# Patient Record
Sex: FEMALE | Race: BLACK OR AFRICAN AMERICAN | NOT HISPANIC OR LATINO | Employment: OTHER | URBAN - METROPOLITAN AREA
[De-identification: names, ages, dates, MRNs, and addresses within clinical notes are randomized per-mention and may not be internally consistent; named-entity substitution may affect disease eponyms.]

---

## 2023-03-01 LAB
ALANINE AMINOTRANSFERASE (SGPT) (U/L) IN SER/PLAS: 12 U/L (ref 7–45)
ALBUMIN (G/DL) IN SER/PLAS: 4 G/DL (ref 3.4–5)
ALKALINE PHOSPHATASE (U/L) IN SER/PLAS: 50 U/L (ref 33–136)
ANION GAP IN SER/PLAS: 12 MMOL/L (ref 10–20)
ASPARTATE AMINOTRANSFERASE (SGOT) (U/L) IN SER/PLAS: 16 U/L (ref 9–39)
BASOPHILS (10*3/UL) IN BLOOD BY AUTOMATED COUNT: 0.04 X10E9/L (ref 0–0.1)
BASOPHILS/100 LEUKOCYTES IN BLOOD BY AUTOMATED COUNT: 0.7 % (ref 0–2)
BILIRUBIN TOTAL (MG/DL) IN SER/PLAS: 0.3 MG/DL (ref 0–1.2)
C REACTIVE PROTEIN (MG/L) IN SER/PLAS BY HIGH SENSIT: 33.9 MG/L
CALCIDIOL (25 OH VITAMIN D3) (NG/ML) IN SER/PLAS: 46 NG/ML
CALCIUM (MG/DL) IN SER/PLAS: 9.9 MG/DL (ref 8.6–10.6)
CARBON DIOXIDE, TOTAL (MMOL/L) IN SER/PLAS: 29 MMOL/L (ref 21–32)
CHLORIDE (MMOL/L) IN SER/PLAS: 102 MMOL/L (ref 98–107)
CHOLESTEROL (MG/DL) IN SER/PLAS: 184 MG/DL (ref 0–199)
CHOLESTEROL IN HDL (MG/DL) IN SER/PLAS: 69.2 MG/DL
CHOLESTEROL/HDL RATIO: 2.7
COBALAMIN (VITAMIN B12) (PG/ML) IN SER/PLAS: 469 PG/ML (ref 211–911)
CREATININE (MG/DL) IN SER/PLAS: 0.89 MG/DL (ref 0.5–1.05)
EOSINOPHILS (10*3/UL) IN BLOOD BY AUTOMATED COUNT: 0.37 X10E9/L (ref 0–0.4)
EOSINOPHILS/100 LEUKOCYTES IN BLOOD BY AUTOMATED COUNT: 6 % (ref 0–6)
ERYTHROCYTE DISTRIBUTION WIDTH (RATIO) BY AUTOMATED COUNT: 15.2 % (ref 11.5–14.5)
ERYTHROCYTE MEAN CORPUSCULAR HEMOGLOBIN CONCENTRATION (G/DL) BY AUTOMATED: 30.2 G/DL (ref 32–36)
ERYTHROCYTE MEAN CORPUSCULAR VOLUME (FL) BY AUTOMATED COUNT: 86 FL (ref 80–100)
ERYTHROCYTES (10*6/UL) IN BLOOD BY AUTOMATED COUNT: 4.88 X10E12/L (ref 4–5.2)
GFR FEMALE: 65 ML/MIN/1.73M2
GLUCOSE (MG/DL) IN SER/PLAS: 81 MG/DL (ref 74–99)
HEMATOCRIT (%) IN BLOOD BY AUTOMATED COUNT: 42.1 % (ref 36–46)
HEMOGLOBIN (G/DL) IN BLOOD: 12.7 G/DL (ref 12–16)
IMMATURE GRANULOCYTES/100 LEUKOCYTES IN BLOOD BY AUTOMATED COUNT: 0.5 % (ref 0–0.9)
LDL: 95 MG/DL (ref 0–99)
LEUKOCYTES (10*3/UL) IN BLOOD BY AUTOMATED COUNT: 6.2 X10E9/L (ref 4.4–11.3)
LYMPHOCYTES (10*3/UL) IN BLOOD BY AUTOMATED COUNT: 1.54 X10E9/L (ref 0.8–3)
LYMPHOCYTES/100 LEUKOCYTES IN BLOOD BY AUTOMATED COUNT: 25 % (ref 13–44)
MONOCYTES (10*3/UL) IN BLOOD BY AUTOMATED COUNT: 0.88 X10E9/L (ref 0.05–0.8)
MONOCYTES/100 LEUKOCYTES IN BLOOD BY AUTOMATED COUNT: 14.3 % (ref 2–10)
NEUTROPHILS (10*3/UL) IN BLOOD BY AUTOMATED COUNT: 3.29 X10E9/L (ref 1.6–5.5)
NEUTROPHILS/100 LEUKOCYTES IN BLOOD BY AUTOMATED COUNT: 53.5 % (ref 40–80)
NRBC (PER 100 WBCS) BY AUTOMATED COUNT: 0 /100 WBC (ref 0–0)
PLATELETS (10*3/UL) IN BLOOD AUTOMATED COUNT: 303 X10E9/L (ref 150–450)
POTASSIUM (MMOL/L) IN SER/PLAS: 4.8 MMOL/L (ref 3.5–5.3)
PROTEIN TOTAL: 6.9 G/DL (ref 6.4–8.2)
SODIUM (MMOL/L) IN SER/PLAS: 138 MMOL/L (ref 136–145)
THYROTROPIN (MIU/L) IN SER/PLAS BY DETECTION LIMIT <= 0.05 MIU/L: 1.16 MIU/L (ref 0.44–3.98)
TRIGLYCERIDE (MG/DL) IN SER/PLAS: 97 MG/DL (ref 0–149)
UREA NITROGEN (MG/DL) IN SER/PLAS: 23 MG/DL (ref 6–23)
VLDL: 19 MG/DL (ref 0–40)

## 2023-03-02 LAB — SARS-COV-2 NUCLEOCAPSID IGG AB: NEGATIVE

## 2023-03-31 DIAGNOSIS — K21.9 GASTROESOPHAGEAL REFLUX DISEASE WITHOUT ESOPHAGITIS: ICD-10-CM

## 2023-03-31 DIAGNOSIS — K21.9 GASTROESOPHAGEAL REFLUX DISEASE WITHOUT ESOPHAGITIS: Primary | ICD-10-CM

## 2023-03-31 RX ORDER — OMEPRAZOLE 20 MG/1
20 CAPSULE, DELAYED RELEASE ORAL DAILY
Qty: 30 CAPSULE | Refills: 5 | Status: SHIPPED | OUTPATIENT
Start: 2023-03-31 | End: 2023-04-05

## 2023-04-05 RX ORDER — OMEPRAZOLE 20 MG/1
CAPSULE, DELAYED RELEASE ORAL
Qty: 90 CAPSULE | Refills: 2 | Status: SHIPPED | OUTPATIENT
Start: 2023-04-05 | End: 2023-11-22

## 2023-04-26 DIAGNOSIS — M54.59 MECHANICAL LOW BACK PAIN: Primary | ICD-10-CM

## 2023-04-26 RX ORDER — CYCLOBENZAPRINE HCL 5 MG
5 TABLET ORAL NIGHTLY
Qty: 90 TABLET | Refills: 2 | Status: SHIPPED | OUTPATIENT
Start: 2023-04-26 | End: 2023-04-27 | Stop reason: SDUPTHER

## 2023-04-26 RX ORDER — CYCLOBENZAPRINE HCL 5 MG
1 TABLET ORAL NIGHTLY
COMMUNITY
Start: 2022-11-15 | End: 2023-04-26 | Stop reason: SDUPTHER

## 2023-04-27 DIAGNOSIS — M54.59 MECHANICAL LOW BACK PAIN: ICD-10-CM

## 2023-04-27 RX ORDER — CYCLOBENZAPRINE HCL 5 MG
5 TABLET ORAL NIGHTLY
Qty: 90 TABLET | Refills: 2 | Status: SHIPPED | OUTPATIENT
Start: 2023-04-27 | End: 2024-02-07 | Stop reason: ALTCHOICE

## 2023-05-01 DIAGNOSIS — M54.59 MECHANICAL LOW BACK PAIN: ICD-10-CM

## 2023-05-01 RX ORDER — CYCLOBENZAPRINE HCL 5 MG
5 TABLET ORAL NIGHTLY
Qty: 10 TABLET | Refills: 1 | OUTPATIENT
Start: 2023-05-01 | End: 2023-05-21

## 2023-05-17 DIAGNOSIS — J30.2 SEASONAL ALLERGIC RHINITIS, UNSPECIFIED TRIGGER: Primary | ICD-10-CM

## 2023-05-17 DIAGNOSIS — M17.0 PRIMARY OSTEOARTHRITIS OF BOTH KNEES: Primary | ICD-10-CM

## 2023-05-17 PROBLEM — R20.2 PARESTHESIA OF LEFT FOOT: Status: ACTIVE | Noted: 2023-05-17

## 2023-05-17 PROBLEM — R26.9 GAIT DISORDER: Status: ACTIVE | Noted: 2023-05-17

## 2023-05-17 PROBLEM — R06.09 DOE (DYSPNEA ON EXERTION): Status: ACTIVE | Noted: 2023-05-17

## 2023-05-17 PROBLEM — G95.9 CERVICAL MYELOPATHY (MULTI): Status: ACTIVE | Noted: 2023-05-17

## 2023-05-17 PROBLEM — J30.0 VASOMOTOR RHINITIS: Status: ACTIVE | Noted: 2023-05-17

## 2023-05-17 PROBLEM — K21.9 GERD (GASTROESOPHAGEAL REFLUX DISEASE): Status: ACTIVE | Noted: 2023-05-17

## 2023-05-17 PROBLEM — M54.12 CERVICAL RADICULOPATHY: Status: ACTIVE | Noted: 2023-05-17

## 2023-05-17 PROBLEM — M19.90 OSTEOARTHRITIS: Status: ACTIVE | Noted: 2023-05-17

## 2023-05-17 RX ORDER — AZELASTINE 1 MG/ML
1 SPRAY, METERED NASAL 2 TIMES DAILY
Qty: 30 ML | Refills: 1 | Status: SHIPPED | OUTPATIENT
Start: 2023-05-17

## 2023-05-17 RX ORDER — AZELASTINE 1 MG/ML
1 SPRAY, METERED NASAL 2 TIMES DAILY
COMMUNITY
End: 2023-05-17 | Stop reason: SDUPTHER

## 2023-06-12 DIAGNOSIS — M19.90 OSTEOARTHRITIS, UNSPECIFIED OSTEOARTHRITIS TYPE, UNSPECIFIED SITE: Primary | ICD-10-CM

## 2023-06-20 RX ORDER — NAPROXEN 500 MG/1
TABLET ORAL
Qty: 200 TABLET | Refills: 2 | Status: SHIPPED | OUTPATIENT
Start: 2023-06-20 | End: 2024-05-10

## 2023-06-25 DIAGNOSIS — M54.12 CERVICAL RADICULOPATHY: Primary | ICD-10-CM

## 2023-06-28 RX ORDER — DULOXETIN HYDROCHLORIDE 20 MG/1
CAPSULE, DELAYED RELEASE ORAL
Qty: 180 CAPSULE | Refills: 3 | Status: SHIPPED | OUTPATIENT
Start: 2023-06-28 | End: 2024-05-29

## 2023-06-28 NOTE — PROGRESS NOTES
Subjective   Patient ID: Tanisha Dueñas is a 82 y.o. female who presents for follow-up visit    HPI   The patient reports that she has experienced intermittent episodes numbness originating over the plantar surface of the right foot radiating to the distal end of the right lower leg since she began another course of physical therapy approximately 6 weeks ago.  She again reports that the episodes are precipitated by standing.  She reports that the episodes do not occur as often as the episodes that occur in the left foot and left lower leg.  She reports no other associated symptoms.    The patient reports decreased in urinary urgency and fewer episodes of urinary incontinence over the past 4 months.  She reports that she has been less dyspneic walking the distance from the parking lot to the building over the past 4 months.  She reports less fatigability during that timeframe.  She still reports continued chronic poor balance-not significantly changed since she completed her most recent course of physical therapy a few weeks ago.  The patient does report continued chronic intermittent episodes of aching pain in the lower back region since her last office visit.  She reports that the episodes still can be precipitated and increased by activity and by standing for long periods.  No other associated symptoms.  She does report a decrease in the frequency but no change in the intensity of the episodes over the past 4 months.  No other new complaints.  Review of Systems    Objective   There were no vitals taken for this visit.    Physical Exam  Head - palpation revealed no tenderness over the maxillary or frontal sinuses.  Nose - turbinates not erythematous or swollen ;no septal deviation noted.  Mouth - posterior pharynx is not erythematous or swollen. Tonsillar pillars appeared normal no exudates.  Neck - no lymphadenopathy. Thyroid gland not enlarged. No bruits.  Lungs - clear to auscultation bilaterally.  Cardiac - rate  normal, rhythm regular, no murmurs, no JVD.  Abdomen - soft nondistended, normal active bowel sounds. Palpation revealed no tenderness or masses.  Pulses - 2+ bilaterally.  Extremities - no peripheral edema.  Musculoskeletal:  Left foot-pes planus noted.  Right foot-pes planus noted.  Skin - Small areas of hyper pigmentation and scaling noted at the distal ends of both thumbs just under each thumbnail. No necrotic tissue noted, no drainage noted, no surrounding erythema noted palpation revealed no tenderness or increase in warmth.. There is only a faint area of erythema located at the lateral end of the left inframammary region. Palpation revealed no tenderness or increase in warmth  Neurologic:  Lower extremities  Motor-  Sensory-Light touch sensation fully intact  Pinprick sensation fully intact    Reflexes-knee jerks 2+/4 bilaterally; ankle jerks 1+/4 bilaterally  Assessment/Plan     Assessment    Chronic intermittent presence of discoloration noted at the distal ends of the thumbs under the thumbnails-may represent tinea corporis, paronychia  Chronic brittle nails-unsure of etiology  Chronic presence of ridges in the fingernails-unsure of etiology  Chronic black discoloration of the third fourth and fifth toenails of the left foot likely secondary to onychomycosis of the toenails  Diastolic dysfunction  Hypertension-blood pressure at goal  Chronic dyspnea with moderate exertion-May be secondary to lack of conditioning, diastolic dysfunction  Microcalcifications in the right breast noted on mammogram January 2016  Fibrocystic disease of the breast  Colonic polyps  Diverticulosis  Prolapsed hemorrhoid.  Chronic constipation-may be a side effect from administration of multiple medications  Chronic intermittent nighttime episodes of urinary urgency with intermittent associated incontinence-may be secondary to overactive bladder, cystocele..  Cystocele  Chronic intermittent primarily evening episodes of diffuse  stiffness-possibly secondary to osteoarthritis..  Osteoarthritis of the right acromioclavicular joint  Full-thickness tear distal supraspinatus tendon right shoulder  Chronic intermittent episodes of pain-unable to describe- in the right knee probably secondary to osteoarthritis of the right knee  Osteoarthritis of the right knee  Hallux valgus rigidus left foot  Adult onset flatfoot deformity left foot  Osteoarthritis of the right ankle  Posterior tibialis tendinitis right foot  Adult onset flatfoot deformity right foot  Hyperlipidemia.  Bilateral cataracts-status post removal cataract right eye December 27, 2017 in the left eye January 17, 2018  Glaucoma  Macular degeneration  Dry eye syndrome  Vitamin D deficiency  Small vessel ischemic changes.  Increased fatigability-may be secondary to lack of conditioning. Diastolic dysfunction, systolic dysfunction are possible etiologies. Obstructive/restrictive lung disease is a possible etiology.  Chronic forgetfulness and worsening of short term memory possibly secondary to age-related cognitive decline, cognitive impairment  Chronic worsening of balance-probably multifactorial secondary to the patient's severe osteoarthritis and possibly cervical spondylotic myelopathy. The patient does possibly have a visual field defect so could she have had a subacute lacunar infarct  Multifactorial gait disorder  Recent history of intermittent episodes of numbness originating over the plantar surface of the right foot radiating to the distal end of the right lower leg-May be secondary to compression neuropathy, right L5 radiculopathy   Chronic intermittent episodes of numbness originating over the plantar surface of the left foot extending throughout the foot radiating to the distal end of the left lower leg precipitated by standing, walking-may be secondary to compression neuropathy, chronic left L5 radiculopathy  Bilateral neuroforaminal stenosis cervical spine C5-C6, 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  I will prescribe alpha lipoid acid 600 mg p.o. twice daily in an effort to treat what I think is the patient's nerve pain.  She will continue all of her other current medications for now.

## 2023-06-29 ENCOUNTER — OFFICE VISIT (OUTPATIENT)
Dept: PRIMARY CARE | Facility: CLINIC | Age: 83
End: 2023-06-29
Payer: MEDICARE

## 2023-06-29 ENCOUNTER — LAB (OUTPATIENT)
Dept: LAB | Facility: LAB | Age: 83
End: 2023-06-29
Payer: MEDICARE

## 2023-06-29 VITALS
BODY MASS INDEX: 34.39 KG/M2 | HEART RATE: 74 BPM | DIASTOLIC BLOOD PRESSURE: 72 MMHG | SYSTOLIC BLOOD PRESSURE: 126 MMHG | WEIGHT: 188 LBS

## 2023-06-29 DIAGNOSIS — R20.2 PARESTHESIA OF LEFT FOOT: ICD-10-CM

## 2023-06-29 DIAGNOSIS — R53.83 FATIGUE, UNSPECIFIED TYPE: ICD-10-CM

## 2023-06-29 DIAGNOSIS — R26.9 GAIT DISORDER: Primary | ICD-10-CM

## 2023-06-29 DIAGNOSIS — R26.9 GAIT DISORDER: ICD-10-CM

## 2023-06-29 LAB
BASOPHILS (10*3/UL) IN BLOOD BY AUTOMATED COUNT: 0.04 X10E9/L (ref 0–0.1)
BASOPHILS/100 LEUKOCYTES IN BLOOD BY AUTOMATED COUNT: 0.7 % (ref 0–2)
EOSINOPHILS (10*3/UL) IN BLOOD BY AUTOMATED COUNT: 0.35 X10E9/L (ref 0–0.4)
EOSINOPHILS/100 LEUKOCYTES IN BLOOD BY AUTOMATED COUNT: 6.5 % (ref 0–6)
ERYTHROCYTE DISTRIBUTION WIDTH (RATIO) BY AUTOMATED COUNT: 15.5 % (ref 11.5–14.5)
ERYTHROCYTE MEAN CORPUSCULAR HEMOGLOBIN CONCENTRATION (G/DL) BY AUTOMATED: 29.9 G/DL (ref 32–36)
ERYTHROCYTE MEAN CORPUSCULAR VOLUME (FL) BY AUTOMATED COUNT: 87 FL (ref 80–100)
ERYTHROCYTES (10*6/UL) IN BLOOD BY AUTOMATED COUNT: 4.39 X10E12/L (ref 4–5.2)
HEMATOCRIT (%) IN BLOOD BY AUTOMATED COUNT: 38.4 % (ref 36–46)
HEMOGLOBIN (G/DL) IN BLOOD: 11.5 G/DL (ref 12–16)
IMMATURE GRANULOCYTES/100 LEUKOCYTES IN BLOOD BY AUTOMATED COUNT: 0.4 % (ref 0–0.9)
LEUKOCYTES (10*3/UL) IN BLOOD BY AUTOMATED COUNT: 5.4 X10E9/L (ref 4.4–11.3)
LYMPHOCYTES (10*3/UL) IN BLOOD BY AUTOMATED COUNT: 1.15 X10E9/L (ref 0.8–3)
LYMPHOCYTES/100 LEUKOCYTES IN BLOOD BY AUTOMATED COUNT: 21.3 % (ref 13–44)
MONOCYTES (10*3/UL) IN BLOOD BY AUTOMATED COUNT: 0.74 X10E9/L (ref 0.05–0.8)
MONOCYTES/100 LEUKOCYTES IN BLOOD BY AUTOMATED COUNT: 13.7 % (ref 2–10)
NEUTROPHILS (10*3/UL) IN BLOOD BY AUTOMATED COUNT: 3.11 X10E9/L (ref 1.6–5.5)
NEUTROPHILS/100 LEUKOCYTES IN BLOOD BY AUTOMATED COUNT: 57.4 % (ref 40–80)
NRBC (PER 100 WBCS) BY AUTOMATED COUNT: 0 /100 WBC (ref 0–0)
PLATELETS (10*3/UL) IN BLOOD AUTOMATED COUNT: 351 X10E9/L (ref 150–450)

## 2023-06-29 PROCEDURE — 85025 COMPLETE CBC W/AUTO DIFF WBC: CPT

## 2023-06-29 PROCEDURE — 1159F MED LIST DOCD IN RCRD: CPT | Performed by: INTERNAL MEDICINE

## 2023-06-29 PROCEDURE — 84443 ASSAY THYROID STIM HORMONE: CPT

## 2023-06-29 PROCEDURE — 99213 OFFICE O/P EST LOW 20 MIN: CPT | Performed by: INTERNAL MEDICINE

## 2023-06-29 PROCEDURE — 1160F RVW MEDS BY RX/DR IN RCRD: CPT | Performed by: INTERNAL MEDICINE

## 2023-06-29 PROCEDURE — 36415 COLL VENOUS BLD VENIPUNCTURE: CPT

## 2023-06-29 PROCEDURE — 86038 ANTINUCLEAR ANTIBODIES: CPT

## 2023-06-29 PROCEDURE — 80053 COMPREHEN METABOLIC PANEL: CPT

## 2023-06-29 PROCEDURE — 83970 ASSAY OF PARATHORMONE: CPT

## 2023-06-29 PROCEDURE — 86431 RHEUMATOID FACTOR QUANT: CPT

## 2023-06-29 RX ORDER — PSYLLIUM HUSK 0.4 G
2 CAPSULE ORAL DAILY
COMMUNITY
Start: 2021-02-15

## 2023-06-29 RX ORDER — TIMOLOL MALEATE 5 MG/ML
1 SOLUTION/ DROPS OPHTHALMIC 2 TIMES DAILY
COMMUNITY
Start: 2019-02-07

## 2023-06-29 RX ORDER — ASPIRIN 81 MG/1
81 TABLET ORAL EVERY OTHER DAY
COMMUNITY
Start: 2020-02-10

## 2023-06-29 RX ORDER — DIPHENHYDRAMINE HCL 2 %
1 CREAM (GRAM) TOPICAL EVERY 6 HOURS PRN
COMMUNITY
Start: 2022-08-18 | End: 2024-02-07 | Stop reason: ALTCHOICE

## 2023-06-29 RX ORDER — AMLODIPINE BESYLATE 5 MG/1
5 TABLET ORAL DAILY
COMMUNITY
End: 2023-09-22 | Stop reason: SDUPTHER

## 2023-06-30 LAB
ALANINE AMINOTRANSFERASE (SGPT) (U/L) IN SER/PLAS: 10 U/L (ref 7–45)
ALBUMIN (G/DL) IN SER/PLAS: 4.1 G/DL (ref 3.4–5)
ALKALINE PHOSPHATASE (U/L) IN SER/PLAS: 56 U/L (ref 33–136)
ANION GAP IN SER/PLAS: 12 MMOL/L (ref 10–20)
ASPARTATE AMINOTRANSFERASE (SGOT) (U/L) IN SER/PLAS: 15 U/L (ref 9–39)
BILIRUBIN TOTAL (MG/DL) IN SER/PLAS: 0.4 MG/DL (ref 0–1.2)
CALCIUM (MG/DL) IN SER/PLAS: 9.4 MG/DL (ref 8.6–10.6)
CARBON DIOXIDE, TOTAL (MMOL/L) IN SER/PLAS: 28 MMOL/L (ref 21–32)
CHLORIDE (MMOL/L) IN SER/PLAS: 105 MMOL/L (ref 98–107)
CREATININE (MG/DL) IN SER/PLAS: 0.98 MG/DL (ref 0.5–1.05)
GFR FEMALE: 57 ML/MIN/1.73M2
GLUCOSE (MG/DL) IN SER/PLAS: 75 MG/DL (ref 74–99)
PARATHYRIN INTACT (PG/ML) IN SER/PLAS: 50.6 PG/ML (ref 18.5–88)
POTASSIUM (MMOL/L) IN SER/PLAS: 5.2 MMOL/L (ref 3.5–5.3)
PROTEIN TOTAL: 6.8 G/DL (ref 6.4–8.2)
RHEUMATOID FACTOR (IU/ML) IN SERUM OR PLASMA: <10 IU/ML (ref 0–15)
SODIUM (MMOL/L) IN SER/PLAS: 140 MMOL/L (ref 136–145)
THYROTROPIN (MIU/L) IN SER/PLAS BY DETECTION LIMIT <= 0.05 MIU/L: 0.7 MIU/L (ref 0.44–3.98)
UREA NITROGEN (MG/DL) IN SER/PLAS: 23 MG/DL (ref 6–23)

## 2023-07-01 ENCOUNTER — TELEPHONE (OUTPATIENT)
Dept: PRIMARY CARE | Facility: CLINIC | Age: 83
End: 2023-07-01
Payer: MEDICARE

## 2023-07-01 DIAGNOSIS — R20.2 PARESTHESIA OF LEFT FOOT: Primary | ICD-10-CM

## 2023-07-02 RX ORDER — PERPHENAZINE 16 MG
600 TABLET ORAL
Qty: 180 CAPSULE | Refills: 2 | Status: SHIPPED | OUTPATIENT
Start: 2023-07-02 | End: 2023-10-10 | Stop reason: ALTCHOICE

## 2023-07-02 NOTE — TELEPHONE ENCOUNTER
I have decided to start the patient on alpha lipoic acid 600 mg p.o. twice daily.  She will return for an office visit in October 2023

## 2023-07-03 LAB
ANTI-NUCLEAR ANTIBODY (ANA): NEGATIVE
CYTOPLASMIC PATTERN: NORMAL

## 2023-09-22 DIAGNOSIS — I10 HYPERTENSION, UNSPECIFIED TYPE: Primary | ICD-10-CM

## 2023-09-22 PROBLEM — M54.12 CERVICAL NEURITIS: Status: ACTIVE | Noted: 2023-09-22

## 2023-09-22 PROBLEM — B02.9 HERPES ZOSTER: Status: ACTIVE | Noted: 2023-09-22

## 2023-09-22 PROBLEM — R92.0 MICROCALCIFICATIONS OF THE BREAST: Status: ACTIVE | Noted: 2023-09-22

## 2023-09-22 PROBLEM — H81.90 VESTIBULOPATHY: Status: ACTIVE | Noted: 2023-09-22

## 2023-09-22 PROBLEM — H81.10 BENIGN PAROXYSMAL POSITIONAL VERTIGO: Status: ACTIVE | Noted: 2023-09-22

## 2023-09-22 PROBLEM — M79.604 PAIN IN BOTH LOWER EXTREMITIES: Status: ACTIVE | Noted: 2023-09-22

## 2023-09-22 PROBLEM — E78.9 ABNORMAL CHOLESTEROL TEST: Status: ACTIVE | Noted: 2023-09-22

## 2023-09-22 PROBLEM — M79.605 PAIN IN BOTH LOWER EXTREMITIES: Status: ACTIVE | Noted: 2023-09-22

## 2023-09-22 PROBLEM — G31.84 MILD COGNITIVE IMPAIRMENT: Status: ACTIVE | Noted: 2023-09-22

## 2023-09-22 RX ORDER — ELECTROLYTES/DEXTROSE
5 SOLUTION, ORAL ORAL DAILY
COMMUNITY
Start: 2013-06-20

## 2023-09-22 RX ORDER — HYDROCORTISONE 25 MG/G
OINTMENT TOPICAL 4 TIMES DAILY
COMMUNITY
Start: 2019-12-27

## 2023-09-22 RX ORDER — AMLODIPINE BESYLATE 5 MG/1
5 TABLET ORAL DAILY
Qty: 90 TABLET | Refills: 2 | Status: SHIPPED | OUTPATIENT
Start: 2023-09-22 | End: 2023-11-22

## 2023-09-22 RX ORDER — CHOLECALCIFEROL (VITAMIN D3) 50 MCG
50 TABLET ORAL DAILY
COMMUNITY
Start: 2013-06-20

## 2023-10-08 NOTE — PROGRESS NOTES
Subjective   Patient ID: Tanisha Dueñas is a 82 y.o. female who presents for follow-up visit    HPI   The patient reports a history of increased passage of flatus after lunch since her last office visit.  She reports no other associated symptoms.    She also reports a history of intermittent nocturnal episodes of itching in the left upper back region over the past few weeks.  She reports no other precipitating, exacerbating, relieving factors.  No other associated symptoms..    The patient reports no dyspnea walking in from her car to my office today.    Since her last office visit, she reports fewer episodes of urinary incontinence.  She still reports continued chronic intermittent nighttime episodes of urinary urgency times years-unchanged since her last office visit.    Since her last office visit, the patient reports continued chronic intermittent episodes of numbness originating over the plantar surfaces of both feet.  She reports that the episodes are precipitated by standing for any extended period of time or by walking.  She reports associated pain radiating to the proximal end of the lower legs bilaterally.  She reports that since her last office visit, her legs feel weaker.  She reports no associated symptoms.  Since her last office visit, she reports an increase in the intensity of pain and that the pain is radiating more proximally.    No other new complaints.  Review of Systems    Objective   There were no vitals taken for this visit.    Physical Exam  Head - palpation revealed no tenderness over the maxillary or frontal sinuses.  Nose - turbinates not erythematous or swollen ;no septal deviation noted.  Mouth - posterior pharynx is not erythematous or swollen. Tonsillar pillars appeared normal no exudates.  Neck - no lymphadenopathy. Thyroid gland not enlarged. No bruits.  Lungs - clear to auscultation bilaterally.  Cardiac - rate normal, rhythm regular, no murmurs, no JVD.  Abdomen - soft nondistended,  normal active bowel sounds. Palpation revealed no tenderness or masses.  Pulses - 2+ bilaterally.  Extremities - no peripheral edema.  Musculoskeletal:  Left foot-pes planus noted.  Right foot-pes planus noted.  Skin - Small areas of hyper pigmentation and scaling noted at the distal ends of both thumbs just under each thumbnail. No necrotic tissue noted, no drainage noted, no surrounding erythema noted palpation revealed no tenderness or increase in warmth.. There is only a faint area of erythema located at the lateral end of the left inframammary region. Palpation revealed no tenderness or increase in warmth  Neurologic:  Lower extremities  Motor-strength 5/5 in all muscle groups tested  Sensory-Light touch sensation fully intact  Pinprick sensation fully intact     Reflexes-knee jerks 2+/4 bilaterally; left ankle jerk 1+/4, right ankle jerk 2+/4     Assessment/Plan         Assessment     Chronic intermittent presence of discoloration noted at the distal ends of the thumbs under the thumbnails-may represent tinea corporis, paronychia  Chronic brittle nails-unsure of etiology  Chronic presence of ridges in the fingernails-unsure of etiology  Chronic black discoloration of the third fourth and fifth toenails of the left foot likely secondary to onychomycosis of the toenails  Diastolic dysfunction  Hypertension-blood pressure at goal  Chronic dyspnea with moderate exertion-May be secondary to lack of conditioning, diastolic dysfunction  Microcalcifications in the right breast noted on mammogram January 2016  Fibrocystic disease of the breast.  Increased passage of flatus after lunch-unsure of etiology.  Colonic polyps  Diverticulosis  Prolapsed hemorrhoid.  Chronic constipation-may be a side effect from administration of multiple medications  Chronic intermittent nighttime episodes of urinary urgency with intermittent associated incontinence-may be secondary to overactive bladder, cystocele..  Cystocele  Chronic  intermittent primarily evening episodes of diffuse stiffness-possibly secondary to osteoarthritis..  Osteoarthritis of the right acromioclavicular joint  Full-thickness tear distal supraspinatus tendon right shoulder  Chronic intermittent episodes of pain-unable to describe- in the right knee probably secondary to osteoarthritis of the right knee  Osteoarthritis of the right knee  Hallux valgus rigidus left foot  Adult onset flatfoot deformity left foot  Osteoarthritis of the right ankle  Posterior tibialis tendinitis right foot  Adult onset flatfoot deformity right foot  Hyperlipidemia.  Bilateral cataracts-status post removal cataract right eye December 27, 2017 in the left eye January 17, 2018  Glaucoma  Macular degeneration  Dry eye syndrome  Vitamin D deficiency  Small vessel ischemic changes.  Chronic increased fatigability-may be secondary to lack of conditioning. Diastolic dysfunction, is a possible etiology.  Obstructive/restrictive lung disease is a possible etiology.  Chronic forgetfulness and worsening of short term memory possibly secondary to age-related cognitive decline, cognitive impairment  Chronic worsening of balance-probably multifactorial secondary to the patient's severe osteoarthritis and possibly cervical spondylotic myelopathy. The patient does possibly have a visual field defect so could she have had a subacute lacunar infarct  Multifactorial gait disorder  Chronic intermittent episodes of numbness originating over the plantar surface of the right foot with associated pain radiating to the proximal end of the right lower leg-May be secondary to compression neuropathy, right L5 radiculopathy   Chronic intermittent episodes of numbness originating over the plantar surface of the left foot with associated pain extending to the proximal end of the left lower leg-precipitated by standing, walking-may be secondary to compression neuropathy, chronic left L5 radiculopathy.  Intermittent nighttime  episodes of itching in the left upper back region at-May be secondary to left cervical radiculopathy secondary to left-sided neuroforaminal stenosis cervical spine.  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 nightly  I told the patient that she could decrease her Prilosec dosage from 20 mg daily to 20 mg p.o. every other day.  The patient will return for a follow-up visit in 8 weeks to assess the episodes of paresthesias over the plantar surfaces of both feet and pain extending to the proximal ends of the lower legs bilaterally

## 2023-10-10 ENCOUNTER — OFFICE VISIT (OUTPATIENT)
Dept: PRIMARY CARE | Facility: CLINIC | Age: 83
End: 2023-10-10
Payer: MEDICARE

## 2023-10-10 VITALS
DIASTOLIC BLOOD PRESSURE: 60 MMHG | SYSTOLIC BLOOD PRESSURE: 120 MMHG | BODY MASS INDEX: 33.76 KG/M2 | HEART RATE: 74 BPM | WEIGHT: 184.6 LBS

## 2023-10-10 DIAGNOSIS — R06.09 DOE (DYSPNEA ON EXERTION): Primary | ICD-10-CM

## 2023-10-10 DIAGNOSIS — K21.9 GASTROESOPHAGEAL REFLUX DISEASE WITHOUT ESOPHAGITIS: ICD-10-CM

## 2023-10-10 DIAGNOSIS — Z12.31 ENCOUNTER FOR SCREENING MAMMOGRAM FOR MALIGNANT NEOPLASM OF BREAST: ICD-10-CM

## 2023-10-10 DIAGNOSIS — I10 HYPERTENSION, UNSPECIFIED TYPE: ICD-10-CM

## 2023-10-10 DIAGNOSIS — Z12.39 BREAST SCREENING: ICD-10-CM

## 2023-10-10 DIAGNOSIS — R20.2 PARESTHESIA OF LEFT FOOT: ICD-10-CM

## 2023-10-10 PROCEDURE — 3074F SYST BP LT 130 MM HG: CPT | Performed by: INTERNAL MEDICINE

## 2023-10-10 PROCEDURE — 1160F RVW MEDS BY RX/DR IN RCRD: CPT | Performed by: INTERNAL MEDICINE

## 2023-10-10 PROCEDURE — 99213 OFFICE O/P EST LOW 20 MIN: CPT | Performed by: INTERNAL MEDICINE

## 2023-10-10 PROCEDURE — 3078F DIAST BP <80 MM HG: CPT | Performed by: INTERNAL MEDICINE

## 2023-10-10 PROCEDURE — 1159F MED LIST DOCD IN RCRD: CPT | Performed by: INTERNAL MEDICINE

## 2023-10-10 RX ORDER — OXCARBAZEPINE 150 MG/1
150 TABLET, FILM COATED ORAL NIGHTLY
COMMUNITY
End: 2023-10-10 | Stop reason: SDUPTHER

## 2023-10-10 RX ORDER — OXCARBAZEPINE 150 MG/1
150 TABLET, FILM COATED ORAL NIGHTLY
Qty: 30 TABLET | Refills: 2 | Status: SHIPPED | OUTPATIENT
Start: 2023-10-10 | End: 2023-12-11 | Stop reason: SDUPTHER

## 2023-11-21 ENCOUNTER — ANCILLARY PROCEDURE (OUTPATIENT)
Dept: RADIOLOGY | Facility: CLINIC | Age: 83
End: 2023-11-21
Payer: MEDICARE

## 2023-11-21 VITALS — HEIGHT: 62 IN | BODY MASS INDEX: 33.96 KG/M2 | WEIGHT: 184.53 LBS

## 2023-11-21 DIAGNOSIS — Z12.31 ENCOUNTER FOR SCREENING MAMMOGRAM FOR MALIGNANT NEOPLASM OF BREAST: ICD-10-CM

## 2023-11-21 DIAGNOSIS — Z12.39 BREAST SCREENING: ICD-10-CM

## 2023-11-21 PROCEDURE — 77067 SCR MAMMO BI INCL CAD: CPT

## 2023-11-21 PROCEDURE — 77063 BREAST TOMOSYNTHESIS BI: CPT | Performed by: RADIOLOGY

## 2023-11-21 PROCEDURE — 77067 SCR MAMMO BI INCL CAD: CPT | Performed by: RADIOLOGY

## 2023-11-22 DIAGNOSIS — K21.9 GASTROESOPHAGEAL REFLUX DISEASE WITHOUT ESOPHAGITIS: ICD-10-CM

## 2023-11-22 DIAGNOSIS — I10 HYPERTENSION, UNSPECIFIED TYPE: ICD-10-CM

## 2023-11-22 RX ORDER — AMLODIPINE BESYLATE 5 MG/1
5 TABLET ORAL DAILY
Qty: 100 TABLET | Refills: 2 | Status: SHIPPED | OUTPATIENT
Start: 2023-11-22

## 2023-11-22 RX ORDER — OMEPRAZOLE 20 MG/1
20 CAPSULE, DELAYED RELEASE ORAL DAILY
Qty: 100 CAPSULE | Refills: 2 | Status: SHIPPED | OUTPATIENT
Start: 2023-11-22 | End: 2024-02-07 | Stop reason: ALTCHOICE

## 2023-12-09 NOTE — PROGRESS NOTES
Subjective   Patient ID: Tanisha Dueñas is a 83 y.o. female who presents for 8-week follow-up    HPI   The patient reports a history of intermittent episodes of aching pain in the right hip region and right groin times a few weeks.  The patient reports that the episodes of pain have been precipitated by the patient raising the right leg and also have been precipitated by walking.  She reports no radiation of discomfort.  She reports no preceding trauma/overexertion.    Since her last office visit, she reports continued chronic intermittent episodes of numbness originating over the plantar surfaces of both feet with associated pain extending to the proximal ends of the lower legs.  She again reports that the episodes are precipitated by standing, walking.  Since her last office visit, she reports no associated symptoms and no change in the frequency or intensity of the episodes.  Patient however never picked up the Trileptal [facility]  Hence, she never started the medication  Review of Systems    Objective   There were no vitals taken for this visit.    Physical Exam  Musculoskeletal-  Right hip-no erythema or swelling..  Windshield wiper test negative.  Full range of motion with pain noted on both internal and external rotation.  Full range of motion with no pain noted in all other directions of motion.  Palpation revealed mild tenderness just below the greater trochanter, no increase in warmth  Assessment/Plan     Assessment           Intermittent episodes of aching pain in the right hip, right groin region-probably secondary to osteoarthritis right hip   Chronic intermittent episodes of numbness originating over the plantar surface of the right foot with associated pain radiating to the proximal end of the right lower leg-May be secondary to compression neuropathy, right L5 radiculopathy   Chronic intermittent episodes of numbness originating over the plantar surface of the left foot  with associated pain extending to the proximal end of the left lower leg-precipitated by standing, walking-may be secondary to compression neuropathy, left L5 radiculopathy.  Plan  Obtain x-rays of the right hip ASAP.  I told the patient to begin application of either Biofreeze or Voltaren gel to the right hip region as directed.  I told the patient to continue use of Naprosyn and Tylenol.  I also again have prescribed Trileptal 150 mg p.o. at bedtime.

## 2023-12-11 ENCOUNTER — OFFICE VISIT (OUTPATIENT)
Dept: PRIMARY CARE | Facility: CLINIC | Age: 83
End: 2023-12-11
Payer: MEDICARE

## 2023-12-11 ENCOUNTER — ANCILLARY PROCEDURE (OUTPATIENT)
Dept: RADIOLOGY | Facility: CLINIC | Age: 83
End: 2023-12-11
Payer: MEDICARE

## 2023-12-11 VITALS — HEART RATE: 80 BPM | SYSTOLIC BLOOD PRESSURE: 130 MMHG | DIASTOLIC BLOOD PRESSURE: 80 MMHG

## 2023-12-11 DIAGNOSIS — M25.551 RIGHT HIP PAIN: ICD-10-CM

## 2023-12-11 DIAGNOSIS — R20.2 PARESTHESIA OF LEFT FOOT: ICD-10-CM

## 2023-12-11 DIAGNOSIS — I10 PRIMARY HYPERTENSION: Primary | ICD-10-CM

## 2023-12-11 PROCEDURE — 73502 X-RAY EXAM HIP UNI 2-3 VIEWS: CPT | Mod: RT,FY

## 2023-12-11 PROCEDURE — 99213 OFFICE O/P EST LOW 20 MIN: CPT | Performed by: INTERNAL MEDICINE

## 2023-12-11 PROCEDURE — 1160F RVW MEDS BY RX/DR IN RCRD: CPT | Performed by: INTERNAL MEDICINE

## 2023-12-11 PROCEDURE — 3079F DIAST BP 80-89 MM HG: CPT | Performed by: INTERNAL MEDICINE

## 2023-12-11 PROCEDURE — 1159F MED LIST DOCD IN RCRD: CPT | Performed by: INTERNAL MEDICINE

## 2023-12-11 PROCEDURE — 73502 X-RAY EXAM HIP UNI 2-3 VIEWS: CPT | Mod: RIGHT SIDE | Performed by: RADIOLOGY

## 2023-12-11 PROCEDURE — 3075F SYST BP GE 130 - 139MM HG: CPT | Performed by: INTERNAL MEDICINE

## 2023-12-11 RX ORDER — OXCARBAZEPINE 150 MG/1
150 TABLET, FILM COATED ORAL NIGHTLY
Qty: 90 TABLET | Refills: 1 | Status: SHIPPED | OUTPATIENT
Start: 2023-12-11 | End: 2023-12-16 | Stop reason: SDUPTHER

## 2023-12-12 NOTE — PROGRESS NOTES
X-rays revealed moderate to severe osteoarthritis right hip.  The patient has only been taking Tylenol 1300 mg p.o. every morning.  I told her that she could increase the dosage to 1300 mg every 6-8 hours as needed for pain.  She will either schedule a live office visit or call me in 2 weeks with her condition.  She may require an orthopedic referral

## 2023-12-15 ENCOUNTER — TELEPHONE (OUTPATIENT)
Dept: PRIMARY CARE | Facility: CLINIC | Age: 83
End: 2023-12-15
Payer: MEDICARE

## 2023-12-16 DIAGNOSIS — R20.2 PARESTHESIA OF LEFT FOOT: ICD-10-CM

## 2023-12-16 RX ORDER — OXCARBAZEPINE 150 MG/1
150 TABLET, FILM COATED ORAL NIGHTLY
Qty: 30 TABLET | Refills: 2 | Status: SHIPPED | OUTPATIENT
Start: 2023-12-16 | End: 2023-12-18

## 2023-12-18 RX ORDER — OXCARBAZEPINE 150 MG/1
TABLET, FILM COATED ORAL
Qty: 30 TABLET | Refills: 3 | Status: SHIPPED | OUTPATIENT
Start: 2023-12-18 | End: 2024-04-02 | Stop reason: SDUPTHER

## 2024-02-06 NOTE — PROGRESS NOTES
Subjective   Reason for Visit: Tanisha Dueñas is an 83 y.o. female here for a Medicare Wellness visit.               HPI  Since 4 days after she began use of Trileptal, the patient reports no pain in the right hip, groin region, right knee.  She reports continued chronic intermittent episodes of numbness originating over the plantar surfaces of the feet radiating to the proximal ends of the lower legs bilaterally.  She again reports that the episodes are precipitated by standing and walking.  She reports no associated symptoms.  Since she began use of Trileptal, she does report a decrease in the frequency but no change in the intensity of episodes.    Over the past 4 months, the patient does report continued chronic intermittent nighttime episodes of itching in the left upper back region.  She reports no precipitating, exacerbating, relieving factors.  She reports no other associated symptoms.    The patient reports a history of morning rhinorrhea from the right nostril, postnasal drip, frequent blowing of the nose in the morning x 1 year.  She reports no associated symptoms.  She does report that the symptoms improved significantly with use of Astelin spray on a as needed basis.      The patient reports continued chronic intermittent episodes of constipation times several years manifested as passage of less stool of a harder consistency-unchanged over the past year.  No other new complaints.  Note that the patient did not begin use of Charcocaps as I had suggested in October 2023  Patient Care Team:  Yossi Gomez MD as PCP - General  Yossi Gomez MD as PCP - [employer] PCP     Review of Systems  All systems have been reviewed and are normal except as previously noted  Objective   Vitals:  There were no vitals taken for this visit.      Physical Exam  Head - palpation revealed no tenderness over the maxillary or frontal sinuses.  Eyes - extraocular movements intact pupils equal and reactive to  light; fundi not well visualized.  Ears - palpation of pinnas and traguses revealed no tenderness. External auditory canals not erythematous or swollen. Left TM not visualized secondary to impacted cerumen. Right TM clear.  Nose - turbinates not erythematous or swollen ;no septal deviation noted.  Mouth -moderate xerostomia noted.  Posterior pharynx is not erythematous or swollen. Tonsillar pillars appeared normal no exudates.  Neck - no lymphadenopathy. Thyroid gland not enlarged. No bruits.  Breast - no asymmetry, no nipple discharge noted.. Palpation revealed no tenderness or masses. No axiliary lymphadenopathy noted.  Lungs - clear to auscultation bilaterally.  Cardiac - rate normal, rhythm regular, no murmurs, no JVD.  Abdomen - soft nondistended, normal active bowel sounds. Palpation revealed no tenderness or masses.  Pulses - 2+ bilaterally in the upper extremities; 0 bilaterally in the lower extremities  Extremities - no peripheral edema.  Musculoskeletal:  Cervical spine - no erythema or swelling. full range of motion in all directions of motion with no pain. Palpation of the left scapular region revealed no tenderness or increase in warmth.  Lumbar spine - No erythema or swelling, Full range of motion with pulling discomfort on bending bilaterally.. Full range of motion with no pain noted in all other directions of motion.. Palpation revealed no tenderness or increase in warmth.    Right knee - no erythema or swelling. Full range of motion with no pain on extension, decreased range of motion with no pain on flexion 80 degrees.  Palpation revealed crepitus and tenderness over the medial surface of the knee but no increase in warmth.. Negative Lachmans test, negative Juliana test, negative patellar apprehension test. There is no pain or laxity of the collateral ligaments noted.  Left foot-pes planus noted.  Right foot-pes planus noted.  Skin - Small areas of hyper pigmentation and scaling noted at the  distal ends of both thumbs just under each thumbnail. No necrotic tissue noted, no drainage noted, no surrounding erythema noted palpation revealed no tenderness or increase in warmth.. There is only a faint area of erythema located at the lateral end of the left inframammary region. Palpation revealed no tenderness or increase in warmth  Neurologic:  Mental status-alert and oriented x3   Cranial nerves-2 through 12 grossly intact, there is a visual field defect left superior temporal field  Motor-no pronator drift noted, strength-5/5 in all muscle groups tested, , no tremor noted. No bradykinesia noted. No rigidity noted. Negative pull test  Sensory-Light touch sensation fully intact  Pinprick sensation fully intact  Vibratory sensation diminished distal to both knees bilaterally equally  Cerebellar-no truncal ataxia, good coordination finger-nose testing,, good coordination heel-to-shin testing, normal rapid alternating movements  Romberg negative, patient unable to perform tandem gait  Reflexes-ankle jerks 1+/4 bilaterally, all other reflexes tested 2+/4 bilaterally   Assessment/Plan   Problem List Items Addressed This Visit    None       Assessment  Chronic intermittent presence of discoloration noted at the distal ends of the thumbs under the thumbnails-may represent tinea corporis, paronychia  Chronic brittle nails-unsure of etiology  Chronic presence of ridges in the fingernails-unsure of etiology  Chronic black discoloration of the third fourth and fifth toenails of the left foot likely secondary to onychomycosis of the toenails  Diastolic dysfunction  Hypertension-blood pressure at goal  Frequent blowing of nose in the morning, rhinorrhea right nostril, postnasal drip-May be secondary to allergic rhinitis, nonallergic rhinitis  Microcalcifications in the right breast noted on mammogram January 2016  Fibrocystic disease of the breast  Colonic polyps  Diverticulosis  Prolapsed hemorrhoid.  Chronic  constipation-probably secondary to patient discontinuing Metamucil capsules  Chronic increased passage of flatus after lunch-unsure of etiology.  Chronic intermittent nighttime episodes of urinary urgency with intermittent associated incontinence-may be secondary to overactive bladder, cystocele..  Cystocele  Chronic intermittent primarily evening episodes of diffuse stiffness-possibly secondary to osteoarthritis..  Osteoarthritis of the right acromioclavicular joint  Full-thickness tear distal supraspinatus tendon right shoulder    Osteoarthritis of the right hip  Chronic intermittent episodes of pain-unable to describe- in the right knee probably secondary to osteoarthritis of the right knee  Osteoarthritis of the right knee  Hallux valgus rigidus left foot  Adult onset flatfoot deformity left foot  Osteoarthritis of the right ankle  Posterior tibialis tendinitis right foot  Adult onset flatfoot deformity right foot  Hyperlipidemia.  Bilateral cataracts-status post removal cataract right eye December 27, 2017 in the left eye January 17, 2018  Glaucoma  Macular degeneration  Dry eye syndrome  Vitamin D deficiency  Small vessel ischemic changes.  Chronic increased fatigability-may be secondary to lack of conditioning.   Chronic forgetfulness and worsening of short term memory possibly secondary to age-related cognitive decline, cognitive impairment  Chronic worsening of balance-probably multifactorial secondary to the patient's severe osteoarthritis and possibly cervical spondylotic myelopathy.  Multifactorial gait disorder   Chronic intermittent episodes of numbness originating over the plantar surface of the left foot extending throughout the foot radiating to the distal end of the left lower leg precipitated by standing, walking-may be secondary to compression neuropathy, chronic left L5 radiculopathy  Chronic intermittent nighttime episodes of itching in the left upper back region may be likely secondary to a  left cervical radiculopathy secondary to left-sided neuroforaminal stenosis cervical spine  Bilateral neuroforaminal stenosis cervical spine C5-C6, C6-C7; moderate cord compression at C4-C5  Chronic intermittent episodes of aching pain in the lower back region-may be secondary to osteoarthritis and degenerative disc disease of the lumbosacral spine  Chronic left L5 radiculopathy    Plan  Obtain EKG, urinalysis, CBC differential, CMP, fasting lipid profile, vitamin D level, vitamin B12 level, today.  I will recommend that the patient begin use of Charcocaps 1 tablet before lunch given the chronic increased passage of flatus after lunch.  I will recommended the patient apply Aspercreme to the left upper back region nightly as needed

## 2024-02-07 ENCOUNTER — LAB (OUTPATIENT)
Dept: LAB | Facility: LAB | Age: 84
End: 2024-02-07
Payer: MEDICARE

## 2024-02-07 ENCOUNTER — OFFICE VISIT (OUTPATIENT)
Dept: PRIMARY CARE | Facility: CLINIC | Age: 84
End: 2024-02-07
Payer: MEDICARE

## 2024-02-07 VITALS — SYSTOLIC BLOOD PRESSURE: 114 MMHG | HEART RATE: 80 BPM | DIASTOLIC BLOOD PRESSURE: 76 MMHG

## 2024-02-07 DIAGNOSIS — D64.9 NORMOCYTIC ANEMIA: Primary | ICD-10-CM

## 2024-02-07 DIAGNOSIS — R26.9 GAIT DISORDER: ICD-10-CM

## 2024-02-07 DIAGNOSIS — I10 PRIMARY HYPERTENSION: ICD-10-CM

## 2024-02-07 DIAGNOSIS — E55.9 VITAMIN D DEFICIENCY: ICD-10-CM

## 2024-02-07 DIAGNOSIS — Z00.00 ROUTINE GENERAL MEDICAL EXAMINATION AT A HEALTH CARE FACILITY: ICD-10-CM

## 2024-02-07 DIAGNOSIS — R26.9 GAIT DISORDER: Primary | ICD-10-CM

## 2024-02-07 DIAGNOSIS — R06.09 DOE (DYSPNEA ON EXERTION): ICD-10-CM

## 2024-02-07 LAB
25(OH)D3 SERPL-MCNC: 55 NG/ML (ref 30–100)
ALBUMIN SERPL BCP-MCNC: 4 G/DL (ref 3.4–5)
ALP SERPL-CCNC: 51 U/L (ref 33–136)
ALT SERPL W P-5'-P-CCNC: 12 U/L (ref 7–45)
ANION GAP SERPL CALC-SCNC: 10 MMOL/L (ref 10–20)
AST SERPL W P-5'-P-CCNC: 14 U/L (ref 9–39)
BASOPHILS # BLD AUTO: 0.05 X10*3/UL (ref 0–0.1)
BASOPHILS NFR BLD AUTO: 0.9 %
BILIRUB SERPL-MCNC: 0.3 MG/DL (ref 0–1.2)
BUN SERPL-MCNC: 20 MG/DL (ref 6–23)
CALCIUM SERPL-MCNC: 9.9 MG/DL (ref 8.6–10.6)
CHLORIDE SERPL-SCNC: 103 MMOL/L (ref 98–107)
CHOLEST SERPL-MCNC: 203 MG/DL (ref 0–199)
CHOLESTEROL/HDL RATIO: 2.8
CO2 SERPL-SCNC: 32 MMOL/L (ref 21–32)
CREAT SERPL-MCNC: 0.93 MG/DL (ref 0.5–1.05)
CRP SERPL HS-MCNC: 9.9 MG/L
EGFRCR SERPLBLD CKD-EPI 2021: 61 ML/MIN/1.73M*2
EOSINOPHIL # BLD AUTO: 0.33 X10*3/UL (ref 0–0.4)
EOSINOPHIL NFR BLD AUTO: 5.7 %
ERYTHROCYTE [DISTWIDTH] IN BLOOD BY AUTOMATED COUNT: 21 % (ref 11.5–14.5)
FERRITIN SERPL-MCNC: 42 NG/ML (ref 8–150)
GLUCOSE SERPL-MCNC: 63 MG/DL (ref 74–99)
HCT VFR BLD AUTO: 36.5 % (ref 36–46)
HDLC SERPL-MCNC: 72.2 MG/DL
HGB BLD-MCNC: 10.7 G/DL (ref 12–16)
HYPOCHROMIA BLD QL SMEAR: NORMAL
IMM GRANULOCYTES # BLD AUTO: 0.02 X10*3/UL (ref 0–0.5)
IMM GRANULOCYTES NFR BLD AUTO: 0.3 % (ref 0–0.9)
LDLC SERPL CALC-MCNC: 110 MG/DL
LYMPHOCYTES # BLD AUTO: 1.42 X10*3/UL (ref 0.8–3)
LYMPHOCYTES NFR BLD AUTO: 24.7 %
MCH RBC QN AUTO: 22.9 PG (ref 26–34)
MCHC RBC AUTO-ENTMCNC: 29.3 G/DL (ref 32–36)
MCV RBC AUTO: 78 FL (ref 80–100)
MONOCYTES # BLD AUTO: 0.93 X10*3/UL (ref 0.05–0.8)
MONOCYTES NFR BLD AUTO: 16.1 %
NEUTROPHILS # BLD AUTO: 3.01 X10*3/UL (ref 1.6–5.5)
NEUTROPHILS NFR BLD AUTO: 52.3 %
NON HDL CHOLESTEROL: 131 MG/DL (ref 0–149)
NRBC BLD-RTO: 0 /100 WBCS (ref 0–0)
OVALOCYTES BLD QL SMEAR: NORMAL
PLATELET # BLD AUTO: 350 X10*3/UL (ref 150–450)
POC APPEARANCE, URINE: CLEAR
POC BILIRUBIN, URINE: ABNORMAL
POC BLOOD, URINE: NEGATIVE
POC COLOR, URINE: YELLOW
POC GLUCOSE, URINE: NEGATIVE MG/DL
POC KETONES, URINE: NEGATIVE MG/DL
POC LEUKOCYTES, URINE: NEGATIVE
POC NITRITE,URINE: NEGATIVE
POC PH, URINE: 5.5 PH
POC PROTEIN, URINE: NEGATIVE MG/DL
POC SPECIFIC GRAVITY, URINE: >=1.03
POC UROBILINOGEN, URINE: 0.2 EU/DL
POLYCHROMASIA BLD QL SMEAR: NORMAL
POTASSIUM SERPL-SCNC: 5 MMOL/L (ref 3.5–5.3)
PROT SERPL-MCNC: 6.8 G/DL (ref 6.4–8.2)
PROT SERPL-MCNC: 6.8 G/DL (ref 6.4–8.2)
RBC # BLD AUTO: 4.68 X10*6/UL (ref 4–5.2)
RBC MORPH BLD: NORMAL
SODIUM SERPL-SCNC: 140 MMOL/L (ref 136–145)
STOMATOCYTES BLD QL SMEAR: NORMAL
TARGETS BLD QL SMEAR: NORMAL
TRIGL SERPL-MCNC: 102 MG/DL (ref 0–149)
TSH SERPL-ACNC: 0.55 MIU/L (ref 0.44–3.98)
VIT B12 SERPL-MCNC: 412 PG/ML (ref 211–911)
VLDL: 20 MG/DL (ref 0–40)
WBC # BLD AUTO: 5.8 X10*3/UL (ref 4.4–11.3)

## 2024-02-07 PROCEDURE — 82607 VITAMIN B-12: CPT

## 2024-02-07 PROCEDURE — 1170F FXNL STATUS ASSESSED: CPT | Performed by: INTERNAL MEDICINE

## 2024-02-07 PROCEDURE — 1159F MED LIST DOCD IN RCRD: CPT | Performed by: INTERNAL MEDICINE

## 2024-02-07 PROCEDURE — 99214 OFFICE O/P EST MOD 30 MIN: CPT | Performed by: INTERNAL MEDICINE

## 2024-02-07 PROCEDURE — 3074F SYST BP LT 130 MM HG: CPT | Performed by: INTERNAL MEDICINE

## 2024-02-07 PROCEDURE — 85025 COMPLETE CBC W/AUTO DIFF WBC: CPT

## 2024-02-07 PROCEDURE — 36415 COLL VENOUS BLD VENIPUNCTURE: CPT

## 2024-02-07 PROCEDURE — 1160F RVW MEDS BY RX/DR IN RCRD: CPT | Performed by: INTERNAL MEDICINE

## 2024-02-07 PROCEDURE — 82306 VITAMIN D 25 HYDROXY: CPT

## 2024-02-07 PROCEDURE — 84443 ASSAY THYROID STIM HORMONE: CPT

## 2024-02-07 PROCEDURE — 3078F DIAST BP <80 MM HG: CPT | Performed by: INTERNAL MEDICINE

## 2024-02-07 PROCEDURE — 82728 ASSAY OF FERRITIN: CPT

## 2024-02-07 PROCEDURE — 80061 LIPID PANEL: CPT

## 2024-02-07 PROCEDURE — 84165 PROTEIN E-PHORESIS SERUM: CPT | Performed by: INTERNAL MEDICINE

## 2024-02-07 PROCEDURE — 84155 ASSAY OF PROTEIN SERUM: CPT

## 2024-02-07 PROCEDURE — G0439 PPPS, SUBSEQ VISIT: HCPCS | Performed by: INTERNAL MEDICINE

## 2024-02-07 PROCEDURE — 84165 PROTEIN E-PHORESIS SERUM: CPT

## 2024-02-07 PROCEDURE — 81002 URINALYSIS NONAUTO W/O SCOPE: CPT | Performed by: INTERNAL MEDICINE

## 2024-02-07 PROCEDURE — 86141 C-REACTIVE PROTEIN HS: CPT

## 2024-02-07 PROCEDURE — 80053 COMPREHEN METABOLIC PANEL: CPT

## 2024-02-07 RX ORDER — LATANOPROST 50 UG/ML
1 SOLUTION/ DROPS OPHTHALMIC DAILY
COMMUNITY
Start: 2023-12-12

## 2024-02-07 ASSESSMENT — ENCOUNTER SYMPTOMS
OCCASIONAL FEELINGS OF UNSTEADINESS: 1
DEPRESSION: 0
LOSS OF SENSATION IN FEET: 0

## 2024-02-07 ASSESSMENT — ACTIVITIES OF DAILY LIVING (ADL)
DRESSING: INDEPENDENT
BATHING: INDEPENDENT

## 2024-02-08 LAB
ALBUMIN: 3.9 G/DL (ref 3.4–5)
ALPHA 1 GLOBULIN: 0.2 G/DL (ref 0.2–0.6)
ALPHA 2 GLOBULIN: 0.7 G/DL (ref 0.4–1.1)
BETA GLOBULIN: 0.8 G/DL (ref 0.5–1.2)
GAMMA GLOBULIN: 1.1 G/DL (ref 0.5–1.4)
PATH REVIEW-SERUM PROTEIN ELECTROPHORESIS: NORMAL
PROTEIN ELECTROPHORESIS COMMENT: NORMAL

## 2024-02-09 ENCOUNTER — TELEPHONE (OUTPATIENT)
Dept: PRIMARY CARE | Facility: CLINIC | Age: 84
End: 2024-02-09
Payer: MEDICARE

## 2024-02-09 DIAGNOSIS — D50.8 OTHER IRON DEFICIENCY ANEMIA: Primary | ICD-10-CM

## 2024-02-09 RX ORDER — FERROUS GLUCONATE 256(28)MG
28 TABLET ORAL DAILY
COMMUNITY
End: 2024-02-09 | Stop reason: SDUPTHER

## 2024-02-09 RX ORDER — FERROUS GLUCONATE 256(28)MG
28 TABLET ORAL DAILY
Qty: 90 TABLET | Refills: 1 | Status: SHIPPED | OUTPATIENT
Start: 2024-02-09

## 2024-02-09 NOTE — TELEPHONE ENCOUNTER
Labs reviewed.  I believe that the patient does have an iron deficiency anemia.  After great discussion, the patient does not want to embark upon a extensive GI evaluation.  I will start her on iron gluconate 1 tablet daily.  She will return for a follow-up visit, repeat CBC differential and ferritin in 3 months

## 2024-03-29 NOTE — PROGRESS NOTES
Last refill on 1 5 2017 #30 with 0 refills Subjective   Patient ID: Tanisha Dueñas is a 83 y.o. female who presents for follow-up visit    HPI   Over the past few weeks, the patient reports a history of increasing forgetfulness and worsening of balance.  She reports associated near constant pain in the right knee as well.  Over the past few weeks, she reports continued chronic intermittent episodes of numbness originating over the plantar surface of the left foot radiating throughout the foot to the distal end of the left lower leg.  She reports an increase in the frequency and intensity of the episodes.  Note that the patient ran out of Trileptal a few weeks ago and never refilled the prescription.    She also reports a history of intermittent episodes of a crawling sensation in the right upper back region times several months.  She reports again that the episodes usually occur at night.  She reports no associated symptoms.  Since her annual Medicare wellness visit, she reports continued chronic intermittent nighttime episodes of itching in the left upper back region.  She reports no change in the frequency or intensity of the episodes since her last office visit, but the patient just purchased Aspercreme within the past few days.  Since her annual Medicare wellness visit, she reports continued chronic increased passage of flatus after lunch-unchanged.  Note that the patient again has not purchased Charcocaps.  No other new complaints.  Review of Systems    Objective   There were no vitals taken for this visit.    Physical Exam    Neck - no lymphadenopathy. Thyroid gland not enlarged. No bruits.  Lungs - clear to auscultation bilaterally.  Cardiac - rate normal, rhythm regular, no murmurs, no JVD.  Abdomen - soft nondistended, normal active bowel sounds. Palpation revealed no tenderness or masses.  Pulses - 2+ bilaterally in the upper extremities; 0 bilaterally in the lower extremities  Extremities - no peripheral edema  Musculoskeletal  Right knee-no  erythema or swelling.  Full range of motion with pain noted in all directions of motion.  Palpation revealed tenderness over the entire knee as well as crepitus but no increase in warmth.  Negative Lachemann's test, negative Juliana's test, negative patellar prehension test, no pain or laxity of the collateral ligaments noted.  Neurologic  Mental status-alert and oriented x3   Cranial nerves-2 through 12 grossly intact, no visual field abnormalities  Motor-no pronator drift noted, strength-5/5 in all muscle groups tested, , no tremor noted.  No bradykinesia noted.  No rigidity noted.  Negative pull test  Sensory-Light touch sensation fully intact  Pinprick sensation fully intact  Vibratory sensation diminished distal to both knees bilaterally equally  Cerebellar-no truncal ataxia, good coordination finger-nose testing,, good coordination heel-to-shin testing, normal rapid alternating movements  Romberg negative, no abnormality in tandem gait  Reflexes-2+/4 bilaterally in the upper extremities; 1+/4 bilaterally in the lower extremities  Assessment/Plan         Assessment  Chronic intermittent presence of discoloration noted at the distal ends of the thumbs under the thumbnails-may represent tinea corporis, paronychia  Chronic brittle nails-unsure of etiology  Chronic presence of ridges in the fingernails-unsure of etiology  Chronic black discoloration of the third fourth and fifth toenails of the left foot likely secondary to onychomycosis of the toenails  Diastolic dysfunction  Hypertension-blood pressure at goal  Chronic frequent blowing of nose in the morning, rhinorrhea right nostril, postnasal drip-May be secondary to allergic rhinitis, nonallergic rhinitis  Microcalcifications in the right breast noted on mammogram January 2016  Fibrocystic disease of the breast  Colonic polyps  Diverticulosis  Prolapsed hemorrhoid.  Chronic constipation-probably secondary to patient discontinuing Metamucil capsules  Chronic  increased passage of flatus after lunch-unsure of etiology.  Chronic intermittent nighttime episodes of urinary urgency with intermittent associated incontinence-may be secondary to overactive bladder, cystocele..  Cystocele  Chronic intermittent primarily evening episodes of diffuse stiffness-possibly secondary to osteoarthritis..  Osteoarthritis of the right acromioclavicular joint  Full-thickness tear distal supraspinatus tendon right shoulder     Osteoarthritis of the right hip  Chronic intermittent episodes of pain-unable to describe- in the right knee-recently increased in frequency and intensity probably secondary to osteoarthritis of the right knee  Osteoarthritis of the right knee  Hallux valgus rigidus left foot  Adult onset flatfoot deformity left foot  Osteoarthritis of the right ankle  Posterior tibialis tendinitis right foot  Adult onset flatfoot deformity right foot  Hyperlipidemia.  Bilateral cataracts-status post removal cataract right eye December 27, 2017 in the left eye January 17, 2018  Glaucoma  Macular degeneration  Dry eye syndrome  Vitamin D deficiency.  Iron deficiency anemia  Chronic increased fatigability-may be secondary to lack of conditioning, iron deficiency anemia.   Small vessel ischemic changes.  Recent history of increased forgetfulness-unsure of etiology.  Recent history of worsening of balance-May be multifactorial secondary to the patient's severe osteoarthritis especially in the right knee and possible cervical spondylotic myelopathy    Multifactorial gait disorder   Chronic intermittent episodes of numbness originating over the plantar surface of the left foot extending throughout the foot radiating to the distal end of the left lower leg precipitated by standing, walking-may be secondary to compression neuropathy, chronic left L5 radiculopathy  Chronic intermittent nighttime episodes of itching in the left upper back region may be likely secondary to a left cervical  radiculopathy secondary to left-sided neuroforaminal stenosis cervical spine.  Long history of intermittent episodes of crawling sensation right upper back region-may be secondary to right-sided neuroforaminal stenosis cervical spine  Bilateral neuroforaminal stenosis cervical spine C5-C6, C6-C7; moderate cord compression at C4-C5  Chronic intermittent episodes of aching pain in the lower back region-may be secondary to osteoarthritis and degenerative disc disease of the lumbosacral spine  Chronic left L5 radiculopathy    Plan  Obtain CBC differential, CMP today.  Restart Trileptal 150 mg p.o. nightly  I again urged the patient to apply Aspercreme to painful regions as directed.  She will continue use of Naprosyn 500 mg p.o. twice daily and Tylenol 1300 mg p.o. every 8 hours as needed.  Patient will return for a follow-up visit in 1 month

## 2024-04-02 ENCOUNTER — LAB (OUTPATIENT)
Dept: LAB | Facility: LAB | Age: 84
End: 2024-04-02
Payer: MEDICARE

## 2024-04-02 ENCOUNTER — OFFICE VISIT (OUTPATIENT)
Dept: PRIMARY CARE | Facility: CLINIC | Age: 84
End: 2024-04-02
Payer: MEDICARE

## 2024-04-02 VITALS
BODY MASS INDEX: 34.78 KG/M2 | WEIGHT: 190.2 LBS | DIASTOLIC BLOOD PRESSURE: 56 MMHG | SYSTOLIC BLOOD PRESSURE: 100 MMHG | HEART RATE: 96 BPM

## 2024-04-02 DIAGNOSIS — I10 PRIMARY HYPERTENSION: Primary | ICD-10-CM

## 2024-04-02 DIAGNOSIS — D50.9 IRON DEFICIENCY ANEMIA, UNSPECIFIED IRON DEFICIENCY ANEMIA TYPE: ICD-10-CM

## 2024-04-02 DIAGNOSIS — M54.12 CERVICAL RADICULOPATHY: ICD-10-CM

## 2024-04-02 DIAGNOSIS — R20.2 PARESTHESIA OF LEFT FOOT: ICD-10-CM

## 2024-04-02 DIAGNOSIS — R14.3 EXCESSIVE FLATUS: ICD-10-CM

## 2024-04-02 DIAGNOSIS — R26.9 GAIT DISORDER: ICD-10-CM

## 2024-04-02 DIAGNOSIS — M54.12 CERVICAL NEURITIS: ICD-10-CM

## 2024-04-02 LAB
ALBUMIN SERPL BCP-MCNC: 4 G/DL (ref 3.4–5)
ALP SERPL-CCNC: 57 U/L (ref 33–136)
ALT SERPL W P-5'-P-CCNC: 12 U/L (ref 7–45)
ANION GAP SERPL CALC-SCNC: 11 MMOL/L (ref 10–20)
AST SERPL W P-5'-P-CCNC: 16 U/L (ref 9–39)
BASOPHILS # BLD AUTO: 0.06 X10*3/UL (ref 0–0.1)
BASOPHILS NFR BLD AUTO: 1 %
BILIRUB SERPL-MCNC: 0.3 MG/DL (ref 0–1.2)
BUN SERPL-MCNC: 25 MG/DL (ref 6–23)
CALCIUM SERPL-MCNC: 10 MG/DL (ref 8.6–10.6)
CHLORIDE SERPL-SCNC: 102 MMOL/L (ref 98–107)
CO2 SERPL-SCNC: 32 MMOL/L (ref 21–32)
CREAT SERPL-MCNC: 0.87 MG/DL (ref 0.5–1.05)
EGFRCR SERPLBLD CKD-EPI 2021: 66 ML/MIN/1.73M*2
EOSINOPHIL # BLD AUTO: 0.35 X10*3/UL (ref 0–0.4)
EOSINOPHIL NFR BLD AUTO: 5.7 %
ERYTHROCYTE [DISTWIDTH] IN BLOOD BY AUTOMATED COUNT: 21.8 % (ref 11.5–14.5)
GLUCOSE SERPL-MCNC: 68 MG/DL (ref 74–99)
HCT VFR BLD AUTO: 43.1 % (ref 36–46)
HGB BLD-MCNC: 12.9 G/DL (ref 12–16)
IMM GRANULOCYTES # BLD AUTO: 0.03 X10*3/UL (ref 0–0.5)
IMM GRANULOCYTES NFR BLD AUTO: 0.5 % (ref 0–0.9)
LYMPHOCYTES # BLD AUTO: 1.63 X10*3/UL (ref 0.8–3)
LYMPHOCYTES NFR BLD AUTO: 26.6 %
MCH RBC QN AUTO: 25 PG (ref 26–34)
MCHC RBC AUTO-ENTMCNC: 29.9 G/DL (ref 32–36)
MCV RBC AUTO: 83 FL (ref 80–100)
MONOCYTES # BLD AUTO: 0.9 X10*3/UL (ref 0.05–0.8)
MONOCYTES NFR BLD AUTO: 14.7 %
NEUTROPHILS # BLD AUTO: 3.16 X10*3/UL (ref 1.6–5.5)
NEUTROPHILS NFR BLD AUTO: 51.5 %
NRBC BLD-RTO: 0 /100 WBCS (ref 0–0)
OVALOCYTES BLD QL SMEAR: NORMAL
PLATELET # BLD AUTO: 299 X10*3/UL (ref 150–450)
POLYCHROMASIA BLD QL SMEAR: NORMAL
POTASSIUM SERPL-SCNC: 4.9 MMOL/L (ref 3.5–5.3)
PROT SERPL-MCNC: 6.8 G/DL (ref 6.4–8.2)
RBC # BLD AUTO: 5.17 X10*6/UL (ref 4–5.2)
RBC MORPH BLD: NORMAL
SODIUM SERPL-SCNC: 140 MMOL/L (ref 136–145)
TARGETS BLD QL SMEAR: NORMAL
WBC # BLD AUTO: 6.1 X10*3/UL (ref 4.4–11.3)

## 2024-04-02 PROCEDURE — 1159F MED LIST DOCD IN RCRD: CPT | Performed by: INTERNAL MEDICINE

## 2024-04-02 PROCEDURE — 3078F DIAST BP <80 MM HG: CPT | Performed by: INTERNAL MEDICINE

## 2024-04-02 PROCEDURE — 1160F RVW MEDS BY RX/DR IN RCRD: CPT | Performed by: INTERNAL MEDICINE

## 2024-04-02 PROCEDURE — 36415 COLL VENOUS BLD VENIPUNCTURE: CPT

## 2024-04-02 PROCEDURE — 85025 COMPLETE CBC W/AUTO DIFF WBC: CPT

## 2024-04-02 PROCEDURE — 80053 COMPREHEN METABOLIC PANEL: CPT

## 2024-04-02 PROCEDURE — 3074F SYST BP LT 130 MM HG: CPT | Performed by: INTERNAL MEDICINE

## 2024-04-02 PROCEDURE — 99213 OFFICE O/P EST LOW 20 MIN: CPT | Performed by: INTERNAL MEDICINE

## 2024-04-02 RX ORDER — OXCARBAZEPINE 150 MG/1
TABLET, FILM COATED ORAL
Qty: 90 TABLET | Refills: 3 | Status: SHIPPED | OUTPATIENT
Start: 2024-04-02 | End: 2024-04-02 | Stop reason: SDUPTHER

## 2024-04-02 RX ORDER — OXCARBAZEPINE 150 MG/1
TABLET, FILM COATED ORAL
Qty: 90 TABLET | Refills: 3 | Status: SHIPPED | OUTPATIENT
Start: 2024-04-02

## 2024-04-16 ENCOUNTER — APPOINTMENT (OUTPATIENT)
Dept: PRIMARY CARE | Facility: CLINIC | Age: 84
End: 2024-04-16
Payer: MEDICARE

## 2024-04-27 PROBLEM — M15.9 GENERALIZED OSTEOARTHROSIS, INVOLVING MULTIPLE SITES: Status: ACTIVE | Noted: 2024-04-27

## 2024-04-28 NOTE — PROGRESS NOTES
Subjective   Patient ID: Tanisha Dueñas is a 83 y.o. female who presents for follow-up visit    HPI   Over the past 4 weeks, the patient does report some increase in the amount of stool that she is passing and she also reports that the stools have not been as hard.  She increased her Metamucil dosage to 3 capsules daily after her last office visit.    Over the past 4 weeks, she reports experiencing intermittent episodes of pain-unable to describe-in the right knee.  She again reports that the episodes occur when she places weight on the leg.  She reports no associated symptoms.  Over the past 4 weeks, she does report a decrease in the frequency and intensity of the episodes    Over the past 4 weeks, the patient reports continued chronic intermittent episodes of numbness originating over the plantar surface of the left foot extending throughout the foot radiating to the distal end of the left lower leg.  She still reports that the episodes are precipitated by standing and walking.  She reports no other associated symptoms.  Over the past 4 weeks, the patient reports a decrease in the frequency and intensity of the episodes.    Over the past 4 weeks, the patient reports continued chronic intermittent nighttime note episodes of itching in the left upper back region and continued chronic intermittent episodes of a crawling sensation in the right upper back region.  She again reports that the episodes usually occur at night.  She reports no associated symptoms.  Over the past 4 weeks, she reports a decrease in the frequency and intensity of the episodes.    She still reports continued increased forgetfulness and worsening of balance-both unchanged over the past 4 weeks.  Review of Systems    Objective   There were no vitals taken for this visit.    Physical Exam  Musculoskeletal  Right knee-no erythema or swelling.  Full range of motion in all directions of motion with no pain.  Palpation revealed mild discomfort over the  medial surface of the knee and crepitus but no increase in warmth.  Negative Lachemann's test, negative Juliana's test, negative patellar apprehension test, no pain or laxity of the collateral ligaments noted.  Neurologic  Lower extremities-  Motor-strength 5/5 in all muscle groups tested  Sensory  Light touch sensation fully intact  Pinprick sensation fully intact  Reflexes left knee jerk 2+/4, all other reflexes tested 1+/4 bilaterally  Assessment/Plan   Problem List Items Addressed This Visit             ICD-10-CM    Gait disorder R26.9    Paresthesia of left foot R20.2    Hypertension - Primary I10    Generalized osteoarthrosis, involving multiple sites M15.9     Other Visit Diagnoses         Codes    Iron deficiency anemia, unspecified iron deficiency anemia type     D50.9                Assessment        Chronic constipation-improved-probably secondary to administration of iron in the setting of multiple medications        Chronic recently intermittent episodes of pain-unable to describe- in the right knee- probably secondary to osteoarthritis of the right knee.  Increased forgetfulness-unsure of etiology.  May be secondary to mild cognitive impairment  Continued worsening of balance-probably multifactorial secondary to the patient's severe osteoarthritis especially in the right knee and possibly moderate cord compression at C4-C5   Multifactorial gait disorder   Chronic intermittent episodes of numbness originating over the plantar surface of the left foot extending throughout the foot radiating to the distal end of the left lower leg precipitated by standing, walking-may be secondary to compression neuropathy, chronic left L5 radiculopathy  Chronic intermittent nighttime episodes of itching in the left upper back region may be likely secondary to a left cervical radiculopathy secondary to left-sided neuroforaminal stenosis cervical spine.  Chronic intermittent episodes of crawling sensation right upper back  region-may be secondary to right-sided neuroforaminal stenosis cervical spine  Bilateral neuroforaminal stenosis cervical spine C5-C6, C6-C7; moderate cord compression at C4-C5  Chronic intermittent episodes of aching pain in the lower back region-may be secondary to osteoarthritis and degenerative disc disease of the lumbosacral spine  Chronic left L5 radiculopathy    Plan  I told the patient to decrease her iron dosage to 1 tablet p.o. every other day  I recommended that she move her oxcarbazepine dosage from 150 mg at bedtime which is 12:30 in the morning to 150 mg p.o. at dinnertime which is at 10 PM.  She will continue her current dosage.  She will return for a follow-up visit in 3 months

## 2024-04-30 ENCOUNTER — OFFICE VISIT (OUTPATIENT)
Dept: PRIMARY CARE | Facility: CLINIC | Age: 84
End: 2024-04-30
Payer: MEDICARE

## 2024-04-30 VITALS
DIASTOLIC BLOOD PRESSURE: 80 MMHG | WEIGHT: 192 LBS | BODY MASS INDEX: 35.11 KG/M2 | HEART RATE: 90 BPM | SYSTOLIC BLOOD PRESSURE: 120 MMHG

## 2024-04-30 DIAGNOSIS — R20.2 PARESTHESIA OF LEFT FOOT: ICD-10-CM

## 2024-04-30 DIAGNOSIS — D50.9 IRON DEFICIENCY ANEMIA, UNSPECIFIED IRON DEFICIENCY ANEMIA TYPE: ICD-10-CM

## 2024-04-30 DIAGNOSIS — R26.9 GAIT DISORDER: ICD-10-CM

## 2024-04-30 DIAGNOSIS — M15.9 GENERALIZED OSTEOARTHROSIS, INVOLVING MULTIPLE SITES: ICD-10-CM

## 2024-04-30 DIAGNOSIS — I10 PRIMARY HYPERTENSION: Primary | ICD-10-CM

## 2024-04-30 PROCEDURE — 3074F SYST BP LT 130 MM HG: CPT | Performed by: INTERNAL MEDICINE

## 2024-04-30 PROCEDURE — 1160F RVW MEDS BY RX/DR IN RCRD: CPT | Performed by: INTERNAL MEDICINE

## 2024-04-30 PROCEDURE — 1159F MED LIST DOCD IN RCRD: CPT | Performed by: INTERNAL MEDICINE

## 2024-04-30 PROCEDURE — 3079F DIAST BP 80-89 MM HG: CPT | Performed by: INTERNAL MEDICINE

## 2024-04-30 PROCEDURE — 99213 OFFICE O/P EST LOW 20 MIN: CPT | Performed by: INTERNAL MEDICINE

## 2024-05-09 DIAGNOSIS — M19.90 OSTEOARTHRITIS, UNSPECIFIED OSTEOARTHRITIS TYPE, UNSPECIFIED SITE: ICD-10-CM

## 2024-05-10 RX ORDER — NAPROXEN 500 MG/1
TABLET ORAL
Qty: 200 TABLET | Refills: 2 | Status: SHIPPED | OUTPATIENT
Start: 2024-05-10

## 2024-05-28 DIAGNOSIS — M54.12 CERVICAL RADICULOPATHY: ICD-10-CM

## 2024-05-29 RX ORDER — DULOXETIN HYDROCHLORIDE 20 MG/1
CAPSULE, DELAYED RELEASE ORAL
Qty: 180 CAPSULE | Refills: 3 | Status: SHIPPED | OUTPATIENT
Start: 2024-05-29

## 2024-08-13 NOTE — PROGRESS NOTES
Subjective   Patient ID: Tanisha Dueñas is a 83 y.o. female who presents for follow-up visit    HPI   Over the past 3.5 months, the patient reports a worsening of balance and feels that she has been moving more slowly since she has been using Trileptal 150 mg p.o. nightly.  She reports that since discontinuing iron 3 weeks ago she has noted an increase in the frequency of bowel movements, passage of softer stool and has been able to empty more easily.    Over the past 3 months, the patient reports continued chronic intermittent nighttime episodes of itching in the left upper back region, continued chronic intermittent episodes of a crawling sensation in the right upper back region.  She reports again that the episodes occur at night.  Over the past 3 months, she reports a decrease in the frequency but no change in the intensity of the episode  No other new complaints  Review of Systems    Objective   There were no vitals taken for this visit.    Physical Exam       Head - palpation revealed no tenderness over the maxillary or frontal sinuses.    Nose - turbinates not erythematous or swollen ;no septal deviation noted.  Mouth -moderate xerostomia noted.  Posterior pharynx is not erythematous or swollen. Tonsillar pillars appeared normal no exudates  Neck - no lymphadenopathy. Thyroid gland not enlarged. No bruits.  Lungs - clear to auscultation bilaterally.  Cardiac - rate normal, rhythm regular,, positive S4 noted no murmurs, no JVD.  Abdomen - soft nondistended, normal active bowel sounds. Palpation revealed no tenderness or masses.  Pulses - 2+ bilaterally in the upper extremities; 0 bilaterally in the lower extremities  Extremities - no peripheral edema    Neurologic  Mental status-alert and oriented x3   Cranial nerves-2 through 12 grossly intact, no visual field abnormalities  Motor-no pronator drift noted, strength-5/5 in all muscle groups tested, , no tremor noted.  No bradykinesia noted.  No rigidity noted.   Negative pull test  Sensory-Light touch sensation fully intact  Pinprick sensation fully intact  Vibratory sensation fully intact.  Cerebellar-no truncal ataxia, good coordination finger-nose testing,, good coordination heel-to-shin testing, normal rapid alternating movements  Romberg negative, no abnormality in tandem gait  Reflexes-2+/4 bilaterally in the upper extremities; 1+/4 bilaterally in the lower extremities  Assessment/Plan   Problem List Items Addressed This Visit             ICD-10-CM    Gait disorder - Primary R26.9    Paresthesia of left foot R20.2    Hypertension I10    Mild cognitive impairment G31.84    Pain in both lower extremities M79.604, M79.605          Assessment  Chronic intermittent presence of discoloration noted at the distal ends of the thumbs under the thumbnails-may represent tinea corporis, paronychia  Chronic brittle nails-unsure of etiology  Chronic presence of ridges in the fingernails-unsure of etiology  Chronic black discoloration of the third fourth and fifth toenails of the left foot likely secondary to onychomycosis of the toenails  Diastolic dysfunction  Hypertension-blood pressure at goal  Chronic frequent blowing of nose in the morning, rhinorrhea right nostril, postnasal drip-May be secondary to allergic rhinitis, nonallergic rhinitis  Microcalcifications in the right breast noted on mammogram January 2016  Fibrocystic disease of the breast  Colonic polyps  Diverticulosis  Prolapsed hemorrhoid.  Chronic constipation-probably secondary to patient discontinuing Metamucil capsules  Chronic increased passage of flatus after lunch-unsure of etiology.  Chronic intermittent nighttime episodes of urinary urgency with intermittent associated incontinence-may be secondary to overactive bladder, cystocele..  Cystocele  Chronic intermittent primarily evening episodes of diffuse stiffness-possibly secondary to osteoarthritis..  Osteoarthritis of the right acromioclavicular  joint  Full-thickness tear distal supraspinatus tendon right shoulder  Osteoarthritis of the right hip  Chronic intermittent episodes of pain-unable to describe- in the right knee-probably secondary to osteoarthritis of the right knee  Osteoarthritis of the right knee  Hallux valgus rigidus left foot  Adult onset flatfoot deformity left foot  Osteoarthritis of the right ankle  Posterior tibialis tendinitis right foot  Adult onset flatfoot deformity right foot  Hyperlipidemia.  Bilateral cataracts-status post removal cataract right eye December 27, 2017 in the left eye January 17, 2018  Glaucoma  Macular degeneration  Dry eye syndrome  Vitamin D deficiency.  Iron deficiency anemia  Chronic increased fatigability-may be secondary to lack of conditioning, iron deficiency anemia.   Small vessel ischemic changes.  Chronic forgetfulness-unsure of etiology.  Progressive worsening of balance-May be multifactorial secondary to the patient's severe osteoarthritis especially in the right knee and possible cervical spondylotic myelopathy  Multifactorial gait disorder   Chronic intermittent episodes of numbness originating over the plantar surface of the left foot extending throughout the foot radiating to the distal end of the left lower leg precipitated by standing, walking-may be secondary to compression neuropathy, chronic left L5 radiculopathy  Chronic intermittent nighttime episodes of itching in the left upper back region may be likely secondary to a left cervical radiculopathy secondary to left-sided neuroforaminal stenosis cervical spine.  Chronic intermittent episodes of crawling sensation right upper back region-may be secondary to right-sided neuroforaminal stenosis cervical spine  Bilateral neuroforaminal stenosis cervical spine C5-C6, C6-C7; moderate cord compression at C4-C5  Chronic intermittent episodes of aching pain in the lower back region-may be secondary to osteoarthritis and degenerative disc disease of  the lumbosacral spine  Chronic left L5 radiculopathy    Plan  I recommended at this time that the patient decrease her Trileptal dosage to 75 mg p.o. nightly  She will continue all of her other current medications and she will return for a follow-up visit in 3 months

## 2024-08-14 ENCOUNTER — APPOINTMENT (OUTPATIENT)
Dept: PRIMARY CARE | Facility: CLINIC | Age: 84
End: 2024-08-14
Payer: MEDICARE

## 2024-08-14 VITALS
SYSTOLIC BLOOD PRESSURE: 114 MMHG | BODY MASS INDEX: 34.45 KG/M2 | WEIGHT: 188.4 LBS | HEART RATE: 66 BPM | DIASTOLIC BLOOD PRESSURE: 66 MMHG

## 2024-08-14 DIAGNOSIS — M79.605 PAIN IN BOTH LOWER EXTREMITIES: ICD-10-CM

## 2024-08-14 DIAGNOSIS — G31.84 MILD COGNITIVE IMPAIRMENT: ICD-10-CM

## 2024-08-14 DIAGNOSIS — R26.9 GAIT DISORDER: Primary | ICD-10-CM

## 2024-08-14 DIAGNOSIS — R20.2 PARESTHESIA OF LEFT FOOT: ICD-10-CM

## 2024-08-14 DIAGNOSIS — M79.604 PAIN IN BOTH LOWER EXTREMITIES: ICD-10-CM

## 2024-08-14 DIAGNOSIS — I10 PRIMARY HYPERTENSION: ICD-10-CM

## 2024-08-14 PROCEDURE — 1160F RVW MEDS BY RX/DR IN RCRD: CPT | Performed by: INTERNAL MEDICINE

## 2024-08-14 PROCEDURE — 3074F SYST BP LT 130 MM HG: CPT | Performed by: INTERNAL MEDICINE

## 2024-08-14 PROCEDURE — 1159F MED LIST DOCD IN RCRD: CPT | Performed by: INTERNAL MEDICINE

## 2024-08-14 PROCEDURE — 3078F DIAST BP <80 MM HG: CPT | Performed by: INTERNAL MEDICINE

## 2024-08-14 PROCEDURE — 99213 OFFICE O/P EST LOW 20 MIN: CPT | Performed by: INTERNAL MEDICINE

## 2024-08-23 DIAGNOSIS — I10 HYPERTENSION, UNSPECIFIED TYPE: ICD-10-CM

## 2024-08-26 RX ORDER — AMLODIPINE BESYLATE 5 MG/1
5 TABLET ORAL DAILY
Qty: 100 TABLET | Refills: 2 | Status: SHIPPED | OUTPATIENT
Start: 2024-08-26

## 2024-11-17 NOTE — PROGRESS NOTES
Subjective   Patient ID: Tanisha Dueñas is a 84 y.o. female who presents for follow-up visit    HPI   The patient reports a history of intermittent episodes of soreness of the left elbow times approximately 2 months.  The patient reports that the episodes may be precipitated by extension.  She also reports that the episodes occur at night when she places pressure on the elbow.  She reports no radiation of discomfort.  She reports no preceding trauma/overexertion.  No other associated symptoms.    Over the past 3 months, the patient reports less frequent blowing of the nose in the morning, less rhinorrhea from the right nostril, less postnasal drip.  She reports that when she uses her nasal spray she also notices a decrease in the frequency of blowing of the nose, decreased rhinorrhea from the right nostril, and a decrease in the amount of postnasal drip.  Since October, the patient reports intermittent episodes of pain-unable describe-originating over the plantar surfaces forefoot regions of both feet extending throughout the foot at the distal ends of the lower legs.  She reports that the episodes are precipitated by standing and walking.  She reports experiencing only intermittent associated numbness in the same distribution.  No other associated symptoms.    The patient reports that she has not experienced any episodes of a crawling sensation in the right upper back region for a few weeks.  With the past 3 months, the patient reports continued chronic intermittent nighttime episodes of itching in the left upper back region.  She still reports that the episodes occur primarily at night.  She reports no associated symptoms.  Over the past 3 months, she reports a decrease in the frequency and intensity of the episodes.    No other new complaints.  Review of Systems    Objective   There were no vitals taken for this visit.    Physical Exam       Head - palpation revealed no tenderness over the maxillary or frontal  sinuses.     Nose - turbinates not erythematous or swollen ;no septal deviation noted.  Mouth -moderate xerostomia noted.  Posterior pharynx is not erythematous or swollen. Tonsillar pillars appeared normal no exudates  Neck - no lymphadenopathy. Thyroid gland not enlarged. No bruits.  Lungs - clear to auscultation bilaterally.  Cardiac - rate normal, rhythm regular,, positive S4 noted no murmurs, no JVD.  Abdomen - soft nondistended, normal active bowel sounds. Palpation revealed no tenderness or masses.    Extremities - no peripheral edema  Musculoskeletal  Left elbow-no erythema or swelling.  Full range of motion with soreness noted on extension as well as pronation and supination of the wrist.  Full range of motion with no soreness noted on flexion.  Palpation revealed no tenderness or increase in warmth  Neurologic  Lower extremities  Motor-strength 5/5 in all muscle groups tested  Sensory  Light touch and pinprick sensation fully intact  Reflexes-1+/4 bilaterally  Assessment/Plan             Assessment  Chronic intermittent presence of discoloration noted at the distal ends of the thumbs under the thumbnails-may represent tinea corporis, paronychia  Chronic brittle nails-unsure of etiology  Chronic presence of ridges in the fingernails-unsure of etiology  Chronic black discoloration of the third fourth and fifth toenails of the left foot likely secondary to onychomycosis of the toenails  Diastolic dysfunction  Hypertension-blood pressure at goal  Chronic frequent blowing of nose in the morning, rhinorrhea right nostril, postnasal drip-May be secondary to allergic rhinitis, nonallergic rhinitis  Microcalcifications in the right breast noted on mammogram January 2016  Fibrocystic disease of the breast  Colonic polyps  Diverticulosis  Prolapsed hemorrhoid.  Chronic constipation-probably secondary to patient discontinuing Metamucil capsules  Chronic increased passage of flatus after lunch-unsure of  etiology.  Chronic intermittent nighttime episodes of urinary urgency with intermittent associated incontinence-may be secondary to overactive bladder, cystocele..  Cystocele  Chronic intermittent primarily evening episodes of diffuse stiffness-possibly secondary to osteoarthritis..  Osteoarthritis of the right acromioclavicular joint  Full-thickness tear distal supraspinatus tendon right shoulder  Intermittent episodes of soreness in the left elbow-May be secondary to osteoarthritis,  Osteoarthritis of the right hip  Chronic intermittent episodes of pain-unable to describe- in the right knee- probably secondary to osteoarthritis of the right knee  Osteoarthritis of the right knee  Hallux valgus rigidus left foot  Adult onset flatfoot deformity left foot  Osteoarthritis of the right ankle  Posterior tibialis tendinitis right foot  Adult onset flatfoot deformity right foot  Hyperlipidemia.  Bilateral cataracts-status post removal cataract right eye December 27, 2017 in the left eye January 17, 2018  Glaucoma  Macular degeneration  Dry eye syndrome  Vitamin D deficiency.  Iron deficiency anemia  Chronic increased fatigability-may be secondary to lack of conditioning, iron deficiency anemia.   Small vessel ischemic changes.  Chronic forgetfulness-unsure of etiology.  Progressive worsening of balance-May be multifactorial secondary to the patient's severe osteoarthritis especially in the right knee and possible cervical spondylotic myelopathy  Multifactorial gait disorder  Recent history of intermittent episodes of pain-unable to describe-originating over the plantar surfaces of the forefoot regions of both feet radiating to the lower legs bilaterally-May be secondary to compression neuropathy, peripheral neuropathy, bilateral lumbar radiculopathies   Chronic intermittent episodes of numbness originating over the plantar surface of the left foot extending throughout the foot radiating to the distal end of the left lower  leg precipitated by standing, walking-may be secondary to compression neuropathy, chronic left L5 radiculopathy  Chronic intermittent nighttime episodes of itching in the left upper back region may be likely secondary to a left cervical radiculopathy secondary to left-sided neuroforaminal stenosis cervical spine.  Chronic intermittent episodes of crawling sensation right upper back region-may be secondary to right-sided neuroforaminal stenosis cervical spine  Bilateral neuroforaminal stenosis cervical spine C5-C6, C6-C7; moderate cord compression at C4-C5  Chronic intermittent episodes of aching pain in the lower back region-may be secondary to osteoarthritis and degenerative disc disease of the lumbosacral spine  Chronic left L5 radiculopathy    Plan  Obtain x-rays of the left elbow today.  Increase Trileptal dosage back to 150 mg p.o. nightly  Continue all other current medications for now.  The patient may be a good candidate for a physical therapy or orthopedic referral given the intermittent episodes of soreness in the left elbow.  The patient will return for her annual Medicare wellness visit February 11, 2025

## 2024-11-19 ENCOUNTER — APPOINTMENT (OUTPATIENT)
Dept: PRIMARY CARE | Facility: CLINIC | Age: 84
End: 2024-11-19
Payer: MEDICARE

## 2024-11-19 ENCOUNTER — HOSPITAL ENCOUNTER (OUTPATIENT)
Dept: RADIOLOGY | Facility: CLINIC | Age: 84
Discharge: HOME | End: 2024-11-19
Payer: MEDICARE

## 2024-11-19 VITALS
HEART RATE: 68 BPM | SYSTOLIC BLOOD PRESSURE: 120 MMHG | DIASTOLIC BLOOD PRESSURE: 68 MMHG | WEIGHT: 188.2 LBS | BODY MASS INDEX: 34.41 KG/M2

## 2024-11-19 DIAGNOSIS — I10 PRIMARY HYPERTENSION: ICD-10-CM

## 2024-11-19 DIAGNOSIS — R20.2 PARESTHESIA OF LEFT FOOT: ICD-10-CM

## 2024-11-19 DIAGNOSIS — M15.0 PRIMARY OSTEOARTHRITIS INVOLVING MULTIPLE JOINTS: ICD-10-CM

## 2024-11-19 DIAGNOSIS — G31.84 MILD COGNITIVE IMPAIRMENT: ICD-10-CM

## 2024-11-19 DIAGNOSIS — M25.522 LEFT ELBOW PAIN: ICD-10-CM

## 2024-11-19 DIAGNOSIS — R26.9 GAIT DISORDER: Primary | ICD-10-CM

## 2024-11-19 PROCEDURE — 3078F DIAST BP <80 MM HG: CPT | Performed by: INTERNAL MEDICINE

## 2024-11-19 PROCEDURE — 73080 X-RAY EXAM OF ELBOW: CPT | Mod: LEFT SIDE | Performed by: RADIOLOGY

## 2024-11-19 PROCEDURE — 1160F RVW MEDS BY RX/DR IN RCRD: CPT | Performed by: INTERNAL MEDICINE

## 2024-11-19 PROCEDURE — 73080 X-RAY EXAM OF ELBOW: CPT | Mod: LT

## 2024-11-19 PROCEDURE — 3074F SYST BP LT 130 MM HG: CPT | Performed by: INTERNAL MEDICINE

## 2024-11-19 PROCEDURE — 1159F MED LIST DOCD IN RCRD: CPT | Performed by: INTERNAL MEDICINE

## 2024-11-19 PROCEDURE — 99213 OFFICE O/P EST LOW 20 MIN: CPT | Performed by: INTERNAL MEDICINE

## 2024-11-19 RX ORDER — OXCARBAZEPINE 150 MG/1
TABLET, FILM COATED ORAL
Qty: 90 TABLET | Refills: 3 | Status: SHIPPED | OUTPATIENT
Start: 2024-11-19

## 2024-12-02 ENCOUNTER — APPOINTMENT (OUTPATIENT)
Dept: PODIATRY | Facility: CLINIC | Age: 84
End: 2024-12-02
Payer: MEDICARE

## 2025-02-04 ENCOUNTER — TELEPHONE (OUTPATIENT)
Dept: OPHTHALMOLOGY | Facility: CLINIC | Age: 85
End: 2025-02-04
Payer: MEDICARE

## 2025-02-04 NOTE — TELEPHONE ENCOUNTER
Receieved referral from [facility] for continuation of care for primary open angle glaucoma (POAG) severe ou.     Called patient to schedule NPV glaucoma with Valentina Ramsey left  w/ my callback info to schedule      -Rock

## 2025-02-09 NOTE — PROGRESS NOTES
Subjective   Reason for Visit: Tanisha Dueñas is an 84 y.o. female here for a Medicare Wellness visit.               HPI  The patient reports a history of intermittent episodes of itching over the palmar surfaces of both hands times a few weeks.  She reports no associated symptoms.  She does report that she has been washing her hands more often as she has a puppy.    Over the past few months, the patient reports that she has been blowing her nose more often in the morning, has experienced more rhinorrhea and more postnasal drip..  No other associated symptoms.  She is using saline nasal spray.  She ran out of Astelin spray 2 or 3 months ago and did not refill the prescription.    Over the past 3 months, the patient reports regular bowel movements with use of Metamucil on a daily basis and she reports no increased passage of flatus after lunch.    Over the past 3 months, the patient reports continued chronic intermittent episodes of soreness, aching pain in the left elbow.  She reports that the episodes are more noticeable at night in bed when she places pressure on the elbow.  She reports no other precipitating factors.  She reports no other associated symptoms.  She does report some decrease in the intensity of pain with application of Biofreeze.    Over the past 3 months, the patient reports continued chronic intermittent episodes of pain-unable to describe-originating over the plantar surfaces of the forefoot regions of both feet rating to the lower legs bilaterally.  She again reports that the episodes are precipitated by standing and walking.  She reports a recent history of constant numbness over the plantar surface of the forefoot region of the left foot and she still reports continued chronic intermittent episodes of numbness originating over the plantar surface of the right foot as well.  Has continued chronic intermittent episodes of numbness over the lower legs bilaterally-unchanged.  No other associated  symptoms.  Over the past 3 months, the patient reports an increase in the intensity of the pain but no change in the frequency of episodes of pain.    The patient reports a recent history of intermittent nighttime episodes of itching in the right upper back region.  Over the past 3 months the patient reports continued chronic intermittent nighttime's episodes of itching in the left upper back region, continued chronic intermittent episodes of a crawling sensation of the right upper back region-unchanged.  No other associated symptoms.  No other new complaints.  Patient Care Team:  Yossi Gomez MD as PCP - General  Yossi Gomez MD as PCP - [employer] PCP     Review of Systems  All systems have been reviewed and are normal except as previously noted  Objective   Vitals:  There were no vitals taken for this visit.      Physical Exam  Head - palpation revealed no tenderness over the maxillary or frontal sinuses.  Eyes - extraocular movements intact pupils equal and reactive to light; fundi not well visualized.  Ears - palpation of pinnas and traguses revealed no tenderness. External auditory canals not erythematous or swollen.  TMs not visualized secondary to impacted cerumen  Nose - turbinates not erythematous or swollen ;no septal deviation noted.  Mouth -moderate xerostomia noted.  Posterior pharynx is not erythematous or swollen. Tonsillar pillars appeared normal no exudates.  Neck - no lymphadenopathy. Thyroid gland not enlarged. No bruits.  Breast - no asymmetry, no nipple discharge noted.. Palpation revealed no tenderness or masses. No axiliary lymphadenopathy noted.  Lungs - clear to auscultation bilaterally.  Cardiac - rate normal, rhythm regular, no murmurs, no JVD.  Abdomen - soft nondistended, normal active bowel sounds. Palpation revealed no tenderness or masses.  Pulses - 2+ bilaterally in the upper extremities; 0 bilaterally in the lower extremities  Extremities - no peripheral  edema.  Musculoskeletal:  Cervical spine - no erythema or swelling. full range of motion in all directions of motion with no pain. Palpation of the left scapular region revealed no tenderness or increase in warmth.  Left elbow-no erythema or swelling.  Full range of motion in all directions of motion with no pain.  Palpation did reveal mild tenderness over the medial epicondyle but no increase in warmth  Lumbar spine - No erythema or swelling, Full range of motion with pulling discomfort on bending bilaterally.. Full range of motion with no pain noted in all other directions of motion.. Palpation revealed no tenderness or increase in warmth.    Right knee - no erythema or swelling. Full range of motion with no pain on extension, decreased range of motion with no pain on flexion 80 degrees.  Palpation revealed crepitus but no tenderness or increase in warmth.. Negative Lachmans test, negative Juliana test, negative patellar apprehension test. There is no pain or laxity of the collateral ligaments noted.  Left foot-pes planus noted.  Right foot-pes planus noted.  Skin - Small areas of hyper pigmentation and scaling noted at the distal ends of both thumbs just under each thumbnail. No necrotic tissue noted, no drainage noted, no surrounding erythema noted palpation revealed no tenderness or increase in warmth.. There is only a faint area of erythema located at the lateral end of the left inframammary region. Palpation revealed no tenderness or increase in warmth  Neurologic:  Mental status-alert and oriented x3   Cranial nerves-2 through 12 grossly intact, no visual field defects noted  Motor-no pronator drift noted, strength-5/5 in all muscle groups tested, , no tremor noted. No bradykinesia noted. No rigidity noted. Negative pull test  Sensory-Light touch sensation fully intact  Pinprick sensation diminished right fifth toe and over the dorsal surfaces of the midfoot regions of both feet bilaterally  equally  Vibratory sensation fully intact.  Cerebellar-no truncal ataxia, good coordination finger-nose testing,, good coordination heel-to-shin testing, normal rapid alternating movements  Romberg negative, patient unable to perform tandem gait  Reflexes--upper extremities 2+/4 bilaterally, lower extremities 0/4 bilaterally  Assessment & Plan  Routine general medical examination at a health care facility    Orders:    CBC and Auto Differential; Future    Comprehensive Metabolic Panel; Future    Lipid Panel; Future    Thyroid Stimulating Hormone; Future    Vitamin B12; Future    Vitamin D 25-Hydroxy,Total (for eval of Vitamin D levels); Future    Serum Protein Electrophoresis; Future    ECG 12 Lead    Primary hypertension    Orders:    CBC and Auto Differential; Future    Comprehensive Metabolic Panel; Future    Lipid Panel; Future    Thyroid Stimulating Hormone; Future    Vitamin B12; Future    Vitamin D 25-Hydroxy,Total (for eval of Vitamin D levels); Future    Serum Protein Electrophoresis; Future    ECG 12 Lead    Generalized osteoarthrosis, involving multiple sites    Orders:    CBC and Auto Differential; Future    Comprehensive Metabolic Panel; Future    Lipid Panel; Future    Thyroid Stimulating Hormone; Future    Vitamin B12; Future    Vitamin D 25-Hydroxy,Total (for eval of Vitamin D levels); Future    Serum Protein Electrophoresis; Future    COOPER (dyspnea on exertion)    Orders:    CBC and Auto Differential; Future    Comprehensive Metabolic Panel; Future    Lipid Panel; Future    Thyroid Stimulating Hormone; Future    Vitamin B12; Future    Vitamin D 25-Hydroxy,Total (for eval of Vitamin D levels); Future    Serum Protein Electrophoresis; Future    Gait disorder    Orders:    CBC and Auto Differential; Future    Comprehensive Metabolic Panel; Future    Lipid Panel; Future    Thyroid Stimulating Hormone; Future    Vitamin B12; Future    Vitamin D 25-Hydroxy,Total (for eval of Vitamin D levels); Future     Serum Protein Electrophoresis; Future    Paresthesia of left foot    Orders:    CBC and Auto Differential; Future    Comprehensive Metabolic Panel; Future    Lipid Panel; Future    Thyroid Stimulating Hormone; Future    Vitamin B12; Future    Vitamin D 25-Hydroxy,Total (for eval of Vitamin D levels); Future    Serum Protein Electrophoresis; Future    Gastroesophageal reflux disease without esophagitis    Orders:    CBC and Auto Differential; Future    Comprehensive Metabolic Panel; Future    Lipid Panel; Future    Thyroid Stimulating Hormone; Future    Vitamin B12; Future    Vitamin D 25-Hydroxy,Total (for eval of Vitamin D levels); Future    Serum Protein Electrophoresis; Future    Vitamin D deficiency    Orders:    Vitamin D 25-Hydroxy,Total (for eval of Vitamin D levels); Future    Seasonal allergic rhinitis, unspecified trigger    Orders:    azelastine (Astelin) 137 mcg (0.1 %) nasal spray; Administer 1 spray into each nostril 2 times a day. Use in each nostril as directed    Left elbow pain    Orders:    Referral to Orthopaedic Surgery; Future    Itching of both hands    Orders:    clobetasol (Temovate) 0.05 % cream; Apply topically 2 times a day for 14 days.               Assessment  Chronic intermittent presence of discoloration noted at the distal ends of the thumbs under the thumbnails-may represent tinea corporis, paronychia  Chronic brittle nails-unsure of etiology  Chronic presence of ridges in the fingernails-unsure of etiology  Chronic black discoloration of the third fourth and fifth toenails of the left foot likely secondary to onychomycosis of the toenails  Diastolic dysfunction  Hypertension-blood pressure at goal  Chronic frequent blowing of nose in the morning, rhinorrhea right nostril, postnasal drip-May be secondary to allergic rhinitis, nonallergic rhinitis  Microcalcifications in the right breast noted on mammogram January 2016  Fibrocystic disease of the breast  Colonic  polyps  Diverticulosis  Prolapsed hemorrhoid.  Chronic constipation-  Chronic intermittent nighttime episodes of urinary urgency with intermittent associated incontinence-may be secondary to overactive bladder, cystocele..  Cystocele  Chronic intermittent primarily evening episodes of diffuse stiffness-possibly secondary to osteoarthritis..  Osteoarthritis of the right acromioclavicular joint  Full-thickness tear distal supraspinatus tendon right shoulder  Chronic intermittent episodes of soreness, aching pain in the left elbow-May be secondary to osteoarthritis,  medial l epicondylitis  Osteoarthritis of the right hip  Chronic intermittent episodes of pain-unable to describe- in the right knee- probably secondary to osteoarthritis of the right knee  Osteoarthritis of the right knee  Hallux valgus rigidus left foot  Adult onset flatfoot deformity left foot  Osteoarthritis of the right ankle  Posterior tibialis tendinitis right foot  Adult onset flatfoot deformity right foot  Hyperlipidemia.  Bilateral cataracts-status post removal cataract right eye December 27, 2017 in the left eye January 17, 2018  Glaucoma  Macular degeneration  Dry eye syndrome  Vitamin D deficiency.  Iron deficiency anemia  Chronic increased fatigability-may be secondary to lack of conditioning, iron deficiency anemia.   Small vessel ischemic changes.  Chronic forgetfulness-unsure of etiology.  Chronic poor balance-May be multifactorial secondary to the patient's severe osteoarthritis especially in the right knee and possible cervical spondylotic myelopathy  Multifactorial gait disorder  Chronic intermittent episodes of pain-unable to describe-originating over the plantar surfaces of the forefoot regions of both feet radiating to the lower legs bilaterally-May be secondary to compression neuropathy, peripheral neuropathy, bilateral lumbar radiculopathies  Constant numbness noted over the plantar surface of the forefoot region of the left  foot-May be secondary to compression neuropathy, chronic left L5 radiculopathy   Chronic intermittent episodes of numbness originating over the plantar surface of the right foot extending throughout the foot radiating to the distal end of the lower legs precipitated by standing, walking-may be secondary to compression neuropathy, bilateral lumbar radiculopathies  Chronic intermittent nighttime episodes of itching in the left upper back region may be likely secondary to a left cervical radiculopathy secondary to left-sided neuroforaminal stenosis cervical spine.  Chronic intermittent episodes of crawling sensation right upper back region-may be secondary to right-sided neuroforaminal stenosis cervical spine  Bilateral neuroforaminal stenosis cervical spine C5-C6, C6-C7; moderate cord compression at C4-C5  Chronic intermittent episodes of aching pain in the lower back region-may be secondary to osteoarthritis and degenerative disc disease of the lumbosacral spine  Chronic left L5 radiculopathy    Plan  Obtain EKG, urinalysis, CBC differential, CMP, fasting lipid profile, TSH, vitamin D level, vitamin B12 level, cardiac CRP, serum immunoelectrophoresis today.  Refer to podiatrist for further evaluation treatment of the patient's bilateral foot pain, paresthesias  Restart Astelin spray 1 spray to each nostril twice daily and continue saline spray as needed.  Increase Trileptal dosage to 225 mg p.o. nightly  Continue all other current medications for now pending the results of lab work.  Patient should return for follow-up visit in 3 months

## 2025-02-09 NOTE — ASSESSMENT & PLAN NOTE
Orders:    CBC and Auto Differential; Future    Comprehensive Metabolic Panel; Future    Lipid Panel; Future    Thyroid Stimulating Hormone; Future    Vitamin B12; Future    Vitamin D 25-Hydroxy,Total (for eval of Vitamin D levels); Future    Serum Protein Electrophoresis; Future

## 2025-02-09 NOTE — ASSESSMENT & PLAN NOTE
Orders:    CBC and Auto Differential; Future    Comprehensive Metabolic Panel; Future    Lipid Panel; Future    Thyroid Stimulating Hormone; Future    Vitamin B12; Future    Vitamin D 25-Hydroxy,Total (for eval of Vitamin D levels); Future    Serum Protein Electrophoresis; Future    ECG 12 Lead

## 2025-02-11 ENCOUNTER — LAB (OUTPATIENT)
Dept: LAB | Facility: HOSPITAL | Age: 85
End: 2025-02-11
Payer: MEDICARE

## 2025-02-11 ENCOUNTER — APPOINTMENT (OUTPATIENT)
Dept: PRIMARY CARE | Facility: CLINIC | Age: 85
End: 2025-02-11
Payer: MEDICARE

## 2025-02-11 VITALS — DIASTOLIC BLOOD PRESSURE: 56 MMHG | SYSTOLIC BLOOD PRESSURE: 116 MMHG | HEART RATE: 70 BPM

## 2025-02-11 DIAGNOSIS — I10 PRIMARY HYPERTENSION: ICD-10-CM

## 2025-02-11 DIAGNOSIS — K21.9 GASTROESOPHAGEAL REFLUX DISEASE WITHOUT ESOPHAGITIS: ICD-10-CM

## 2025-02-11 DIAGNOSIS — M15.9 GENERALIZED OSTEOARTHROSIS, INVOLVING MULTIPLE SITES: ICD-10-CM

## 2025-02-11 DIAGNOSIS — R26.9 GAIT DISORDER: ICD-10-CM

## 2025-02-11 DIAGNOSIS — L29.9 ITCHING OF BOTH HANDS: ICD-10-CM

## 2025-02-11 DIAGNOSIS — Z00.00 ROUTINE GENERAL MEDICAL EXAMINATION AT A HEALTH CARE FACILITY: Primary | ICD-10-CM

## 2025-02-11 DIAGNOSIS — R06.09 DOE (DYSPNEA ON EXERTION): ICD-10-CM

## 2025-02-11 DIAGNOSIS — J30.2 SEASONAL ALLERGIC RHINITIS, UNSPECIFIED TRIGGER: ICD-10-CM

## 2025-02-11 DIAGNOSIS — R20.2 PARESTHESIA OF LEFT FOOT: ICD-10-CM

## 2025-02-11 DIAGNOSIS — E55.9 VITAMIN D DEFICIENCY: ICD-10-CM

## 2025-02-11 DIAGNOSIS — M25.522 LEFT ELBOW PAIN: ICD-10-CM

## 2025-02-11 LAB
POC APPEARANCE, URINE: CLEAR
POC BILIRUBIN, URINE: NEGATIVE
POC BLOOD, URINE: NEGATIVE
POC COLOR, URINE: ABNORMAL
POC GLUCOSE, URINE: NEGATIVE MG/DL
POC KETONES, URINE: NEGATIVE MG/DL
POC LEUKOCYTES, URINE: NEGATIVE
POC NITRITE,URINE: NEGATIVE
POC PH, URINE: 6 PH
POC PROTEIN, URINE: ABNORMAL MG/DL
POC SPECIFIC GRAVITY, URINE: >=1.03
POC UROBILINOGEN, URINE: 0.2 EU/DL
PROT SERPL-MCNC: 7.2 G/DL (ref 6.4–8.2)

## 2025-02-11 PROCEDURE — 86320 SERUM IMMUNOELECTROPHORESIS: CPT | Performed by: STUDENT IN AN ORGANIZED HEALTH CARE EDUCATION/TRAINING PROGRAM

## 2025-02-11 PROCEDURE — 1159F MED LIST DOCD IN RCRD: CPT | Performed by: INTERNAL MEDICINE

## 2025-02-11 PROCEDURE — 93000 ELECTROCARDIOGRAM COMPLETE: CPT | Performed by: INTERNAL MEDICINE

## 2025-02-11 PROCEDURE — 81002 URINALYSIS NONAUTO W/O SCOPE: CPT | Performed by: INTERNAL MEDICINE

## 2025-02-11 PROCEDURE — 1036F TOBACCO NON-USER: CPT | Performed by: INTERNAL MEDICINE

## 2025-02-11 PROCEDURE — 84165 PROTEIN E-PHORESIS SERUM: CPT

## 2025-02-11 PROCEDURE — 86320 SERUM IMMUNOELECTROPHORESIS: CPT | Performed by: INTERNAL MEDICINE

## 2025-02-11 PROCEDURE — 84165 PROTEIN E-PHORESIS SERUM: CPT | Performed by: STUDENT IN AN ORGANIZED HEALTH CARE EDUCATION/TRAINING PROGRAM

## 2025-02-11 PROCEDURE — 3078F DIAST BP <80 MM HG: CPT | Performed by: INTERNAL MEDICINE

## 2025-02-11 PROCEDURE — 3074F SYST BP LT 130 MM HG: CPT | Performed by: INTERNAL MEDICINE

## 2025-02-11 PROCEDURE — 99214 OFFICE O/P EST MOD 30 MIN: CPT | Performed by: INTERNAL MEDICINE

## 2025-02-11 PROCEDURE — 84155 ASSAY OF PROTEIN SERUM: CPT

## 2025-02-11 PROCEDURE — 1160F RVW MEDS BY RX/DR IN RCRD: CPT | Performed by: INTERNAL MEDICINE

## 2025-02-11 PROCEDURE — 86334 IMMUNOFIX E-PHORESIS SERUM: CPT

## 2025-02-11 PROCEDURE — G0439 PPPS, SUBSEQ VISIT: HCPCS | Performed by: INTERNAL MEDICINE

## 2025-02-11 PROCEDURE — 1170F FXNL STATUS ASSESSED: CPT | Performed by: INTERNAL MEDICINE

## 2025-02-11 PROCEDURE — 84165 PROTEIN E-PHORESIS SERUM: CPT | Performed by: INTERNAL MEDICINE

## 2025-02-11 RX ORDER — CLOBETASOL PROPIONATE 0.5 MG/G
CREAM TOPICAL 2 TIMES DAILY
Qty: 30 G | Refills: 1 | Status: SHIPPED | OUTPATIENT
Start: 2025-02-11 | End: 2025-02-13 | Stop reason: SDUPTHER

## 2025-02-11 RX ORDER — AZELASTINE 1 MG/ML
1 SPRAY, METERED NASAL 2 TIMES DAILY
Qty: 30 ML | Refills: 1 | Status: SHIPPED | OUTPATIENT
Start: 2025-02-11 | End: 2025-02-13 | Stop reason: SDUPTHER

## 2025-02-11 ASSESSMENT — ACTIVITIES OF DAILY LIVING (ADL)
TAKING_MEDICATION: INDEPENDENT
DRESSING: INDEPENDENT
DOING_HOUSEWORK: INDEPENDENT
MANAGING_FINANCES: INDEPENDENT
GROCERY_SHOPPING: INDEPENDENT
BATHING: INDEPENDENT

## 2025-02-11 ASSESSMENT — ENCOUNTER SYMPTOMS
OCCASIONAL FEELINGS OF UNSTEADINESS: 1
LOSS OF SENSATION IN FEET: 1
DEPRESSION: 0

## 2025-02-12 LAB
25(OH)D3+25(OH)D2 SERPL-MCNC: 49 NG/ML (ref 30–100)
ALBUMIN SERPL-MCNC: 4.3 G/DL (ref 3.6–5.1)
ALP SERPL-CCNC: 52 U/L (ref 37–153)
ALT SERPL-CCNC: 11 U/L (ref 6–29)
ANION GAP SERPL CALCULATED.4IONS-SCNC: 7 MMOL/L (CALC) (ref 7–17)
AST SERPL-CCNC: 15 U/L (ref 10–35)
BASOPHILS # BLD AUTO: 22 CELLS/UL (ref 0–200)
BASOPHILS NFR BLD AUTO: 0.4 %
BILIRUB SERPL-MCNC: 0.4 MG/DL (ref 0.2–1.2)
BUN SERPL-MCNC: 23 MG/DL (ref 7–25)
CALCIUM SERPL-MCNC: 9.9 MG/DL (ref 8.6–10.4)
CHLORIDE SERPL-SCNC: 105 MMOL/L (ref 98–110)
CHOLEST SERPL-MCNC: 217 MG/DL
CHOLEST/HDLC SERPL: 2.8 (CALC)
CO2 SERPL-SCNC: 29 MMOL/L (ref 20–32)
CREAT SERPL-MCNC: 0.87 MG/DL (ref 0.6–0.95)
EGFRCR SERPLBLD CKD-EPI 2021: 66 ML/MIN/1.73M2
EOSINOPHIL # BLD AUTO: 270 CELLS/UL (ref 15–500)
EOSINOPHIL NFR BLD AUTO: 4.9 %
ERYTHROCYTE [DISTWIDTH] IN BLOOD BY AUTOMATED COUNT: 13.5 % (ref 11–15)
GLUCOSE SERPL-MCNC: 87 MG/DL (ref 65–139)
HCT VFR BLD AUTO: 44.2 % (ref 35–45)
HDLC SERPL-MCNC: 77 MG/DL
HGB BLD-MCNC: 14 G/DL (ref 11.7–15.5)
LDLC SERPL CALC-MCNC: 121 MG/DL (CALC)
LYMPHOCYTES # BLD AUTO: 1133 CELLS/UL (ref 850–3900)
LYMPHOCYTES NFR BLD AUTO: 20.6 %
MCH RBC QN AUTO: 27.6 PG (ref 27–33)
MCHC RBC AUTO-ENTMCNC: 31.7 G/DL (ref 32–36)
MCV RBC AUTO: 87 FL (ref 80–100)
MONOCYTES # BLD AUTO: 737 CELLS/UL (ref 200–950)
MONOCYTES NFR BLD AUTO: 13.4 %
NEUTROPHILS # BLD AUTO: 3339 CELLS/UL (ref 1500–7800)
NEUTROPHILS NFR BLD AUTO: 60.7 %
NONHDLC SERPL-MCNC: 140 MG/DL (CALC)
PLATELET # BLD AUTO: 307 THOUSAND/UL (ref 140–400)
PMV BLD REES-ECKER: 10.1 FL (ref 7.5–12.5)
POTASSIUM SERPL-SCNC: 4.9 MMOL/L (ref 3.5–5.3)
PROT SERPL-MCNC: 7.1 G/DL (ref 6.1–8.1)
RBC # BLD AUTO: 5.08 MILLION/UL (ref 3.8–5.1)
SODIUM SERPL-SCNC: 141 MMOL/L (ref 135–146)
TRIGL SERPL-MCNC: 89 MG/DL
TSH SERPL-ACNC: 0.57 MIU/L (ref 0.4–4.5)
VIT B12 SERPL-MCNC: 374 PG/ML (ref 200–1100)
WBC # BLD AUTO: 5.5 THOUSAND/UL (ref 3.8–10.8)

## 2025-02-13 DIAGNOSIS — J30.2 SEASONAL ALLERGIC RHINITIS, UNSPECIFIED TRIGGER: ICD-10-CM

## 2025-02-13 DIAGNOSIS — L29.9 ITCHING OF BOTH HANDS: ICD-10-CM

## 2025-02-13 LAB
ALBUMIN: 4.3 G/DL (ref 3.4–5)
ALPHA 1 GLOBULIN: 0.3 G/DL (ref 0.2–0.6)
ALPHA 2 GLOBULIN: 0.8 G/DL (ref 0.4–1.1)
BETA GLOBULIN: 0.8 G/DL (ref 0.5–1.2)
GAMMA GLOBULIN: 1.1 G/DL (ref 0.5–1.4)
IMMUNOFIXATION COMMENT: NORMAL
M-PROTEIN 1: 0.1 G/DL
PATH REVIEW - SERUM IMMUNOFIXATION: NORMAL
PATH REVIEW-SERUM PROTEIN ELECTROPHORESIS: ABNORMAL
PROTEIN ELECTROPHORESIS COMMENT: ABNORMAL

## 2025-02-13 RX ORDER — AZELASTINE 1 MG/ML
1 SPRAY, METERED NASAL 2 TIMES DAILY
Qty: 30 ML | Refills: 1 | Status: SHIPPED | OUTPATIENT
Start: 2025-02-13

## 2025-02-13 RX ORDER — CLOBETASOL PROPIONATE 0.5 MG/G
CREAM TOPICAL 2 TIMES DAILY
Qty: 30 G | Refills: 1 | Status: SHIPPED | OUTPATIENT
Start: 2025-02-13 | End: 2025-02-27

## 2025-02-18 ENCOUNTER — DOCUMENTATION (OUTPATIENT)
Dept: PRIMARY CARE | Facility: CLINIC | Age: 85
End: 2025-02-18
Payer: MEDICARE

## 2025-02-18 NOTE — PROGRESS NOTES
Labs reviewed.  The patient does have an MGUS-very low amount of monoclonal protein.  She will return for a follow-up visit in 3 months and I will repeat the lab work at that time

## 2025-02-25 ENCOUNTER — OFFICE VISIT (OUTPATIENT)
Dept: ORTHOPEDIC SURGERY | Facility: CLINIC | Age: 85
End: 2025-02-25
Payer: MEDICARE

## 2025-02-25 DIAGNOSIS — M25.522 LEFT ELBOW PAIN: ICD-10-CM

## 2025-02-25 PROCEDURE — 1160F RVW MEDS BY RX/DR IN RCRD: CPT | Performed by: ORTHOPAEDIC SURGERY

## 2025-02-25 PROCEDURE — 99214 OFFICE O/P EST MOD 30 MIN: CPT | Performed by: ORTHOPAEDIC SURGERY

## 2025-02-25 PROCEDURE — 1036F TOBACCO NON-USER: CPT | Performed by: ORTHOPAEDIC SURGERY

## 2025-02-25 PROCEDURE — 1159F MED LIST DOCD IN RCRD: CPT | Performed by: ORTHOPAEDIC SURGERY

## 2025-02-25 PROCEDURE — 99204 OFFICE O/P NEW MOD 45 MIN: CPT | Performed by: ORTHOPAEDIC SURGERY

## 2025-02-25 NOTE — Clinical Note
February 26, 2025     Yossi Gomez MD  [address] [facility] [address]    Patient: Tanisha Dueñas   YOB: 1940   Date of Visit: 2/25/2025       Dear Dr. Yossi Gomez MD:    Thank you for referring Tanisha Dueñas to me for evaluation. Below are my notes for this consultation.  If you have questions, please do not hesitate to call me. I look forward to following your patient along with you.       Sincerely,     Marco Delarosa MD      CC: No Recipients  ______________________________________________________________________________________

## 2025-02-25 NOTE — LETTER
pertinent positives and negatives are listed above.  The form has been scanned separately into the medical record.      PHYSICAL EXAM    Constitutional:    Appears younger than her stated age. Well-developed and well-nourished overweight female in no acute distress.  Psychiatric:         Pleasant normal mood and affect. Behavior is appropriate for the situation.   Head:                   Normocephalic and atraumatic.  Eyes:                    Pupils are equal and round.  Cardiovascular:  2+ radial and ulnar pulses. Fingers well-perfused.  Respiratory:        Effort normal. No respiratory distress. Speaking in complete sentences.  Neurologic:       Alert and oriented to person, place, and time.  Skin:                Skin is intact, warm and dry.  Hematologic / Lymphatic:    No lymphedema or lymphangitis.    Extremities / Musculoskeletal:                      Skin of the left elbow and forearm is intact with no erythema, ecchymosis, or diffuse swelling.  Normal skin drag and coloration.  Full composite finger flexion extension with good intrinsic plus minus posture.  Symmetric elbow hinge and forearm rotation motion.  Stable collaterals.  No joint effusion.  No particular tenderness at the medial epicondyle.  No Tinel at the cubital tunnel.  Negative elbow flexion test.  Sensation intact to light touch in all distributions.  Capillary refill less than 2 seconds.      IMAGING / LABS / EMGs           X-rays left elbow from [facility].  There is no acute fracture, subluxation, or foreign body.  The joint spaces are concentric and well-preserved on all views.  There are mild on November 19 were independently interpreted by me today EDL and lateral enthesophytes.      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    Ataxic gait   • Diverticulosis of intestine, part unspecified, without perforation or abscess without bleeding 02/22/2022    Diverticulosis   • Encounter for screening mammogram for malignant  neoplasm of breast     Visit for screening mammogram   • Encounter for screening mammogram for malignant neoplasm of breast 01/25/2016    Screening mammogram for high-risk patient   • Personal history of other endocrine, nutritional and metabolic disease 02/15/2021    History of hyperlipidemia   • Polyp of cervix uteri 01/28/2016    Cervical polyp       Medication Documentation Review Audit       Reviewed by Yossi Gomez MD (Physician) on 02/11/25 at 1319      Medication Order Taking? Sig Documenting Provider Last Dose Status   amLODIPine (Norvasc) 5 mg tablet [id number]  TAKE 1 TABLET BY MOUTH DAILY Yossi Gomez MD  Active   aspirin 81 mg EC tablet [id number]  Take 1 tablet (81 mg) by mouth every other day. Historical Provider, MD  Active   azelastine (Astelin) 137 mcg (0.1 %) nasal spray [id number]  Administer 1 spray into each nostril 2 times a day. Use in each nostril as directed Yossi Gomez MD  Active   biotin 5 mg capsule [id number]  Take 1 capsule (5 mg) by mouth once daily. Historical Provider, MD  Active   cholecalciferol (Vitamin D-3) 50 MCG (2000 UT) tablet [id number]  Take 1 tablet (50 mcg) by mouth once daily. Historical Provider, MD  Active   DULoxetine (Cymbalta) 20 mg DR capsule [id number]  TAKE 1 CAPSULE BY MOUTH TWICE  DAILY Yossi Gomez MD  Active   hydrocortisone 2.5 % ointment [id number]  4 times a day. Historical Provider, MD  Active   latanoprost (Xalatan) 0.005 % ophthalmic solution [id number]  Administer 1 drop into both eyes once daily. Historical Provider, MD  Active   naproxen (Naprosyn) 500 mg tablet [id number]  TAKE 1 TABLET BY MOUTH EVERY 12  HOURS AS NEEDED WITH FOOD Yossi Gomez MD  Active   OXcarbazepine (Trileptal) 150 mg tablet [id number]  TAKE 1 TABLET(150 MG) BY MOUTH EVERY DAY AT BEDTIME Yossi Gomez MD  Active   psyllium (MetamuciL) 0.4 gram capsule [id number]  Take 2 capsules by mouth once daily. Historical Provider, MD  Active   timolol (Timoptic) 0.5 % ophthalmic solution  [id number]  Administer 1 drop into affected eye(s) 2 times a day. Historical Provider, MD  Active                    Allergies   Allergen Reactions   • Codeine Unknown       Social History     Socioeconomic History   • Marital status:      Spouse name: Not on file   • Number of children: Not on file   • Years of education: Not on file   • Highest education level: Not on file   Occupational History   • Not on file   Tobacco Use   • Smoking status: Former     Current packs/day: 0.00     Average packs/day: 2.0 packs/day for 74.0 years (148.0 ttl pk-yrs)     Types: Cigarettes     Start date:      Quit date:      Years since quittin.1   • Smokeless tobacco: Never   Substance and Sexual Activity   • Alcohol use: Never   • Drug use: Not on file   • Sexual activity: Not on file   Other Topics Concern   • Not on file   Social History Narrative   • Not on file     Social Drivers of Health     Financial Resource Strain: Not on file   Food Insecurity: Not on file   Transportation Needs: Not on file   Physical Activity: Not on file   Stress: Not on file   Social Connections: Not on file   Intimate Partner Violence: Not on file   Housing Stability: Not on file       Past Surgical History:   Procedure Laterality Date   • CARPAL TUNNEL RELEASE  2016    Neuroplasty Decompression Median Nerve At Carpal Tunnel   • COLONOSCOPY  2019    Complete Colonoscopy   • OTHER SURGICAL HISTORY  2016    Wrist Excision Of Ganglion         Electronically Signed      CARROLL Delarosa MD      Orthopaedic Hand Surgery      [phone]

## 2025-02-25 NOTE — PROGRESS NOTES
CHIEF COMPLAINT         Left elbow pain    ASSESSMENT + PLAN     ***        HISTORY OF PRESENT ILLNESS       Patient is a 84 y.o. ***-hand dominant female ***, who presents today for evaluation of ***      REVIEW OF SYSTEMS       A 30-item multi-system Review Of Systems was obtained on today's intake form.  This was reviewed with the patient and is correct.  The pertinent positives and negatives are listed above.  The form has been scanned separately into the medical record.      PHYSICAL EXAM    Constitutional:    Appears stated age. Well-developed and well-nourished ***.  Psychiatric:         Pleasant normal mood and affect. Behavior is appropriate for the situation.   Head:                   Normocephalic and atraumatic.  Eyes:                    Pupils are equal and round.  Cardiovascular:  2+ radial and ulnar pulses. Fingers well-perfused.  Respiratory:        Effort normal. No respiratory distress. Speaking in complete sentences.  Neurologic:       Alert and oriented to person, place, and time.  Skin:                Skin is intact, warm and dry.  Hematologic / Lymphatic:    No lymphedema or lymphangitis.    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/2022    Ataxic gait    Diverticulosis of intestine, part unspecified, without perforation or abscess without bleeding 02/22/2022    Diverticulosis    Encounter for screening mammogram for malignant neoplasm of breast     Visit for screening mammogram    Encounter for screening mammogram for malignant neoplasm of breast 01/25/2016    Screening mammogram for high-risk patient    Personal history of other endocrine, nutritional and metabolic disease 02/15/2021    History of hyperlipidemia    Polyp of cervix uteri 01/28/2016    Cervical polyp       Medication Documentation Review Audit       Reviewed by Yossi Gomez MD (Physician) on 02/11/25 at 1310       Medication Order Taking? Sig Documenting Provider Last Dose Status   amLODIPine (Norvasc) 5 mg tablet [id number]  TAKE 1 TABLET BY MOUTH DAILY Yossi Gomez MD  Active   aspirin 81 mg EC tablet [id number]  Take 1 tablet (81 mg) by mouth every other day. Historical Provider, MD  Active   azelastine (Astelin) 137 mcg (0.1 %) nasal spray [id number]  Administer 1 spray into each nostril 2 times a day. Use in each nostril as directed Yossi Gomez MD  Active   biotin 5 mg capsule [id number]  Take 1 capsule (5 mg) by mouth once daily. Historical Provider, MD  Active   cholecalciferol (Vitamin D-3) 50 MCG (2000 UT) tablet [id number]  Take 1 tablet (50 mcg) by mouth once daily. Historical Provider, MD  Active   DULoxetine (Cymbalta) 20 mg DR capsule [id number]  TAKE 1 CAPSULE BY MOUTH TWICE  DAILY Yossi Gomez MD  Active   hydrocortisone 2.5 % ointment [id number]  4 times a day. Historical Provider, MD  Active   latanoprost (Xalatan) 0.005 % ophthalmic solution [id number]  Administer 1 drop into both eyes once daily. Historical Provider, MD  Active   naproxen (Naprosyn) 500 mg tablet [id number]  TAKE 1 TABLET BY MOUTH EVERY 12  HOURS AS NEEDED WITH FOOD Yossi Gomez MD  Active   OXcarbazepine (Trileptal) 150 mg tablet [id number]  TAKE 1 TABLET(150 MG) BY MOUTH EVERY DAY AT BEDTIME Yossi Gomez MD  Active   psyllium (MetamuciL) 0.4 gram capsule [id number]  Take 2 capsules by mouth once daily. Historical Provider, MD  Active   timolol (Timoptic) 0.5 % ophthalmic solution [id number]  Administer 1 drop into affected eye(s) 2 times a day. Historical Provider, MD  Active                    Allergies   Allergen Reactions    Codeine Unknown       Social History     Socioeconomic History    Marital status:      Spouse name: Not on file    Number of children: Not on file    Years of education: Not on file    Highest education level: Not on file   Occupational History    Not on file   Tobacco Use    Smoking status: Former      Current packs/day: 0.00     Average packs/day: 2.0 packs/day for 74.0 years (148.0 ttl pk-yrs)     Types: Cigarettes     Start date:      Quit date:      Years since quittin.1    Smokeless tobacco: Never   Substance and Sexual Activity    Alcohol use: Never    Drug use: Not on file    Sexual activity: Not on file   Other Topics Concern    Not on file   Social History Narrative    Not on file     Social Drivers of Health     Financial Resource Strain: Not on file   Food Insecurity: Not on file   Transportation Needs: Not on file   Physical Activity: Not on file   Stress: Not on file   Social Connections: Not on file   Intimate Partner Violence: Not on file   Housing Stability: Not on file       Past Surgical History:   Procedure Laterality Date    CARPAL TUNNEL RELEASE  2016    Neuroplasty Decompression Median Nerve At Carpal Tunnel    COLONOSCOPY  2019    Complete Colonoscopy    OTHER SURGICAL HISTORY  2016    Wrist Excision Of Ganglion         Electronically Signed      CARROLL Delarosa MD      Orthopaedic Hand Surgery      [phone]   Start date:      Quit date:      Years since quittin.1    Smokeless tobacco: Never   Substance and Sexual Activity    Alcohol use: Never    Drug use: Not on file    Sexual activity: Not on file   Other Topics Concern    Not on file   Social History Narrative    Not on file     Social Drivers of Health     Financial Resource Strain: Not on file   Food Insecurity: Not on file   Transportation Needs: Not on file   Physical Activity: Not on file   Stress: Not on file   Social Connections: Not on file   Intimate Partner Violence: Not on file   Housing Stability: Not on file       Past Surgical History:   Procedure Laterality Date    CARPAL TUNNEL RELEASE  2016    Neuroplasty Decompression Median Nerve At Carpal Tunnel    COLONOSCOPY  2019    Complete Colonoscopy    OTHER SURGICAL HISTORY  2016    Wrist Excision Of Ganglion         Electronically Signed      CARROLL Delarosa MD      Orthopaedic Hand Surgery      [phone]

## 2025-03-11 NOTE — PROGRESS NOTES
3/12/2025 CC: 84 y.o. presents for glaucoma evaluation.    Past ocular history: POAG, Pseudophakia OU  Family history: no family history of glaucoma   Past medical history: HTN, HZV, BPPV   Social history: denies tobacco     Eye medications:   Both eyes: Latanoprost qhs and Timolol bid     Allergy:  Codeine    Testing:    HVF 24-2 3/12/2025: OD- SAD, MD -13.3. OS- SAD>IAD, MD -19.1 dB    OCT 3/12/2025: OD- thin I/S, avg 57. OS- thin I/S, avg 53 um. GCA: diffuse thin OU. Mac.: Regular macular contour, /214    Pachymetry 3/12/2025: 549/551    Gonioscopy 3/12/2025: open OU    Tmax: unknown    Assessment:   Primary open angle glaucoma, moderate OD, severe OS  -Negative fhx  -Average C  - Thin rim OS  - Testing: Baseline today (ask for previous records)  - Tolerates Latanoprost qhs and Timolol bid  Target IOP: mid-teens/low teens  -IOP 3/12/2025: 13 OU. Given her medical hx and target IOP OS, will switch Timolol to Brimonidine bid.    Pseudophakia OU  -s/p PCIOL (~ 10 yrs ago)    RE  -Low hyperopia /ast., presbyopia     Plan:   I explained my findings. POAG, IOP acceptable.    Eye medications:   Both eyes: Continue Latanoprost qhs and start Brimonidine bid (stop Timolol)    Return in 2 mo, IOP check

## 2025-03-12 ENCOUNTER — APPOINTMENT (OUTPATIENT)
Dept: OPHTHALMOLOGY | Facility: CLINIC | Age: 85
End: 2025-03-12
Payer: MEDICARE

## 2025-03-12 DIAGNOSIS — H40.1133 PRIMARY OPEN ANGLE GLAUCOMA (POAG) OF BOTH EYES, SEVERE STAGE: Primary | ICD-10-CM

## 2025-03-12 PROCEDURE — 76514 ECHO EXAM OF EYE THICKNESS: CPT | Performed by: OPHTHALMOLOGY

## 2025-03-12 PROCEDURE — 92020 GONIOSCOPY: CPT | Performed by: OPHTHALMOLOGY

## 2025-03-12 PROCEDURE — 92134 CPTRZ OPH DX IMG PST SGM RTA: CPT | Performed by: OPHTHALMOLOGY

## 2025-03-12 PROCEDURE — 99204 OFFICE O/P NEW MOD 45 MIN: CPT | Performed by: OPHTHALMOLOGY

## 2025-03-12 PROCEDURE — 92083 EXTENDED VISUAL FIELD XM: CPT | Performed by: OPHTHALMOLOGY

## 2025-03-12 RX ORDER — BRIMONIDINE TARTRATE 2 MG/ML
1 SOLUTION/ DROPS OPHTHALMIC 2 TIMES DAILY
Qty: 45 ML | Refills: 4 | Status: SHIPPED | OUTPATIENT
Start: 2025-03-12 | End: 2026-03-12

## 2025-03-12 RX ORDER — LATANOPROST 50 UG/ML
1 SOLUTION/ DROPS OPHTHALMIC NIGHTLY
Qty: 7.5 ML | Refills: 4 | Status: SHIPPED | OUTPATIENT
Start: 2025-03-12 | End: 2026-03-12

## 2025-03-12 ASSESSMENT — CONF VISUAL FIELD
OS_NORMAL: 1
OS_SUPERIOR_TEMPORAL_RESTRICTION: 0
OD_INFERIOR_TEMPORAL_RESTRICTION: 0
OD_INFERIOR_NASAL_RESTRICTION: 0
OS_INFERIOR_NASAL_RESTRICTION: 0
OS_INFERIOR_TEMPORAL_RESTRICTION: 0
METHOD: COUNTING FINGERS
OD_SUPERIOR_TEMPORAL_RESTRICTION: 0
OD_SUPERIOR_NASAL_RESTRICTION: 3
OS_SUPERIOR_NASAL_RESTRICTION: 0

## 2025-03-12 ASSESSMENT — GONIOSCOPY
OS_SUPERIOR: PTM
OS_INFERIOR: PTM
OD_TEMPORAL: PTM
OD_NASAL: PTM
OD_INFERIOR: PTM
OS_TEMPORAL: PTM
OD_SUPERIOR: PTM
OS_NASAL: PTM

## 2025-03-12 ASSESSMENT — ENCOUNTER SYMPTOMS
HEMATOLOGIC/LYMPHATIC NEGATIVE: 0
CARDIOVASCULAR NEGATIVE: 0
EYES NEGATIVE: 0
NEUROLOGICAL NEGATIVE: 0
CONSTITUTIONAL NEGATIVE: 0
PSYCHIATRIC NEGATIVE: 0
GASTROINTESTINAL NEGATIVE: 0
ALLERGIC/IMMUNOLOGIC NEGATIVE: 0
ENDOCRINE NEGATIVE: 0
RESPIRATORY NEGATIVE: 0
MUSCULOSKELETAL NEGATIVE: 0

## 2025-03-12 ASSESSMENT — TONOMETRY
OS_IOP_MMHG: 13
OD_IOP_MMHG: 13
IOP_METHOD: GOLDMANN APPLANATION

## 2025-03-12 ASSESSMENT — VISUAL ACUITY
OD_CC: 20/40
OD_PH_CC: 20/25
CORRECTION_TYPE: GLASSES
OS_CC+: -3
OS_CC: 20/25
METHOD: SNELLEN - LINEAR
OD_PH_CC+: -3

## 2025-03-12 ASSESSMENT — CUP TO DISC RATIO
OS_RATIO: 0.9
OD_RATIO: 0.9

## 2025-03-12 ASSESSMENT — SLIT LAMP EXAM - LIDS
COMMENTS: NORMAL
COMMENTS: NORMAL

## 2025-03-12 ASSESSMENT — REFRACTION_WEARINGRX
OD_SPHERE: +0.25
OS_CYLINDER: -0.25
OD_CYLINDER: -0.25
OS_SPHERE: +0.25
OD_ADD: +3.00
OS_ADD: +3.00
OD_AXIS: 082
OS_AXIS: 003

## 2025-03-12 ASSESSMENT — EXTERNAL EXAM - LEFT EYE: OS_EXAM: NORMAL

## 2025-03-12 ASSESSMENT — PACHYMETRY
OS_CT(UM): 551
EXAM_DATE: 3/12/2025
OD_CT(UM): 549

## 2025-03-12 ASSESSMENT — EXTERNAL EXAM - RIGHT EYE: OD_EXAM: NORMAL

## 2025-03-18 DIAGNOSIS — R20.2 PARESTHESIA OF LEFT FOOT: ICD-10-CM

## 2025-03-18 RX ORDER — OXCARBAZEPINE 150 MG/1
TABLET, FILM COATED ORAL
Qty: 90 TABLET | Refills: 3 | Status: SHIPPED | OUTPATIENT
Start: 2025-03-18

## 2025-03-24 NOTE — PROGRESS NOTES
The patient called today to report that since beginning use of Trileptal, she has been progressively slowing down, having more difficulty with ambulation.  In addition, she has noted no significant change in the frequency or intensity of the pain and numbness in feet despite taking a 225 mg dose at bedtime.  I told the patient that she could decrease her dosage to 75 mg p.o. nightly x 7 days and then stop.  She will keep her scheduled visit in May.  I told the patient that I had very little to offer her with respect to the pain in her feet and legs other than increasing her dosage of duloxetine.  When we did this in the past she again complained of more difficulty with balance and worsening gait.  I also reminded the patient that I have referred her back to Dr. Jamie Adair for further evaluation

## 2025-03-31 ENCOUNTER — APPOINTMENT (OUTPATIENT)
Dept: PODIATRY | Facility: CLINIC | Age: 85
End: 2025-03-31
Payer: MEDICARE

## 2025-05-01 DIAGNOSIS — M54.12 CERVICAL RADICULOPATHY: ICD-10-CM

## 2025-05-01 RX ORDER — DULOXETIN HYDROCHLORIDE 20 MG/1
20 CAPSULE, DELAYED RELEASE ORAL 2 TIMES DAILY
Qty: 180 CAPSULE | Refills: 3 | Status: SHIPPED | OUTPATIENT
Start: 2025-05-01

## 2025-05-06 DIAGNOSIS — M19.90 OSTEOARTHRITIS, UNSPECIFIED OSTEOARTHRITIS TYPE, UNSPECIFIED SITE: ICD-10-CM

## 2025-05-06 RX ORDER — NAPROXEN 500 MG/1
500 TABLET ORAL EVERY 12 HOURS PRN
Qty: 200 TABLET | Refills: 2 | Status: SHIPPED | OUTPATIENT
Start: 2025-05-06

## 2025-05-11 NOTE — PROGRESS NOTES
Subjective   Patient ID: Tanisha Dueñas is a 84 y.o. female who presents for follow-up visit    HPI     Review of Systems    Objective   There were no vitals taken for this visit.    Physical Exam  Lungs - clear to auscultation bilaterally.  Cardiac - rate normal, rhythm regular, no murmurs, no JVD.  Abdomen - soft nondistended, normal active bowel sounds. Palpation revealed no tenderness or masses.  Pulses - 2+ bilaterally in the upper extremities; 0 bilaterally in the lower extremities  Extremities - no peripheral edema  Assessment/Plan   {Assess/PlanSmartLinks:[id number]}         Assessment  Chronic intermittent presence of discoloration noted at the distal ends of the thumbs under the thumbnails-may represent tinea corporis, paronychia  Chronic brittle nails-unsure of etiology  Chronic presence of ridges in the fingernails-unsure of etiology  Chronic black discoloration of the third fourth and fifth toenails of the left foot likely secondary to onychomycosis of the toenails  Diastolic dysfunction  Hypertension-blood pressure at goal  Chronic frequent blowing of nose in the morning, rhinorrhea right nostril, postnasal drip-May be secondary to allergic rhinitis, nonallergic rhinitis  Microcalcifications in the right breast noted on mammogram January 2016  Fibrocystic disease of the breast  Colonic polyps  Diverticulosis  Prolapsed hemorrhoid.  Chronic constipation-  Chronic intermittent nighttime episodes of urinary urgency with intermittent associated incontinence-may be secondary to overactive bladder, cystocele..  Cystocele  Chronic intermittent primarily evening episodes of diffuse stiffness-possibly secondary to osteoarthritis..  Osteoarthritis of the right acromioclavicular joint  Full-thickness tear distal supraspinatus tendon right shoulder  Chronic intermittent episodes of soreness, aching pain in the left elbow-May be secondary to osteoarthritis,    Osteoarthritis of the right hip  Chronic intermittent  episodes of pain-unable to describe- in the right knee- probably secondary to osteoarthritis of the right knee  Osteoarthritis of the right knee  Hallux valgus rigidus left foot  Adult onset flatfoot deformity left foot  Osteoarthritis of the right ankle  Posterior tibialis tendinitis right foot  Adult onset flatfoot deformity right foot  Hyperlipidemia.  Bilateral cataracts-status post removal cataract right eye December 27, 2017 in the left eye January 17, 2018  Glaucoma  Macular degeneration  Dry eye syndrome  Vitamin D deficiency.  MGUS  Iron deficiency anemia  Chronic increased fatigability-may be secondary to lack of conditioning, iron deficiency anemia.   Small vessel ischemic changes.  Chronic forgetfulness-unsure of etiology.  Chronic poor balance-May be multifactorial secondary to the patient's severe osteoarthritis especially in the right knee and possible cervical spondylotic myelopathy  Multifactorial gait disorder  Chronic intermittent episodes of pain-unable to describe-originating over the plantar surfaces of the forefoot regions of both feet radiating to the lower legs bilaterally-May be secondary to compression neuropathy, peripheral neuropathy, bilateral lumbar radiculopathies  Chronic constant numbness noted over the plantar surface of the forefoot region of the left foot-chronic intermittent episodes of numbness over the left lower leg-May be secondary to compression neuropathy, chronic left L5 radiculopathy   Chronic intermittent episodes of numbness originating over the plantar surface of the right foot extending throughout the foot radiating to the distal end of the lower leg precipitated by standing, walking-may be secondary to compression neuropathy, right lumbar radiculopathy  Chronic intermittent nighttime episodes of itching in the left upper back region may be likely secondary to a left cervical radiculopathy secondary to left-sided neuroforaminal stenosis cervical spine.  Chronic  intermittent nighttime episodes of itching in the right upper back region-May be secondary to right-sided neuroforaminal stenosis cervical spine  Chronic intermittent episodes of crawling sensation right upper back region-may be secondary to right-sided neuroforaminal stenosis cervical spine  Bilateral neuroforaminal stenosis cervical spine C5-C6, C6-C7; moderate cord compression at C4-C5  Chronic intermittent episodes of aching pain in the lower back region-may be secondary to osteoarthritis and degenerative disc disease of the lumbosacral spine  Chronic left L5 radiculopathy    Plan  Obtain serum immunoelectrophoresis today

## 2025-05-13 ENCOUNTER — LAB (OUTPATIENT)
Dept: LAB | Facility: HOSPITAL | Age: 85
End: 2025-05-13
Payer: MEDICARE

## 2025-05-13 ENCOUNTER — APPOINTMENT (OUTPATIENT)
Dept: PRIMARY CARE | Facility: CLINIC | Age: 85
End: 2025-05-13
Payer: MEDICARE

## 2025-05-13 DIAGNOSIS — R20.2 PARESTHESIA OF LEFT FOOT: ICD-10-CM

## 2025-05-13 DIAGNOSIS — R26.9 GAIT DISORDER: ICD-10-CM

## 2025-05-13 DIAGNOSIS — I10 PRIMARY HYPERTENSION: Primary | ICD-10-CM

## 2025-05-13 DIAGNOSIS — D47.2 MONOCLONAL GAMMOPATHY: ICD-10-CM

## 2025-05-13 DIAGNOSIS — I10 ESSENTIAL (PRIMARY) HYPERTENSION: Primary | ICD-10-CM

## 2025-05-13 DIAGNOSIS — G31.84 MILD COGNITIVE IMPAIRMENT: ICD-10-CM

## 2025-05-13 DIAGNOSIS — D47.2 MGUS (MONOCLONAL GAMMOPATHY OF UNKNOWN SIGNIFICANCE): ICD-10-CM

## 2025-05-13 DIAGNOSIS — R06.09 DOE (DYSPNEA ON EXERTION): ICD-10-CM

## 2025-05-13 LAB — PROT SERPL-MCNC: 6.8 G/DL (ref 6.4–8.2)

## 2025-05-13 PROCEDURE — 84155 ASSAY OF PROTEIN SERUM: CPT

## 2025-05-13 PROCEDURE — 84165 PROTEIN E-PHORESIS SERUM: CPT

## 2025-05-14 LAB
ALBUMIN SERPL-MCNC: 4.2 G/DL (ref 3.6–5.1)
ALBUMIN: 4 G/DL (ref 3.4–5)
ALP SERPL-CCNC: 49 U/L (ref 37–153)
ALPHA 1 GLOBULIN: 0.3 G/DL (ref 0.2–0.6)
ALPHA 2 GLOBULIN: 0.7 G/DL (ref 0.4–1.1)
ALT SERPL-CCNC: 13 U/L (ref 6–29)
ANION GAP SERPL CALCULATED.4IONS-SCNC: 11 MMOL/L (CALC) (ref 7–17)
AST SERPL-CCNC: 16 U/L (ref 10–35)
BASOPHILS # BLD AUTO: 32 CELLS/UL (ref 0–200)
BASOPHILS NFR BLD AUTO: 0.6 %
BETA GLOBULIN: 0.7 G/DL (ref 0.5–1.2)
BILIRUB SERPL-MCNC: 0.4 MG/DL (ref 0.2–1.2)
BUN SERPL-MCNC: 22 MG/DL (ref 7–25)
CALCIUM SERPL-MCNC: 9.5 MG/DL (ref 8.6–10.4)
CHLORIDE SERPL-SCNC: 104 MMOL/L (ref 98–110)
CO2 SERPL-SCNC: 23 MMOL/L (ref 20–32)
CREAT SERPL-MCNC: 0.87 MG/DL (ref 0.6–0.95)
EGFRCR SERPLBLD CKD-EPI 2021: 66 ML/MIN/1.73M2
EOSINOPHIL # BLD AUTO: 170 CELLS/UL (ref 15–500)
EOSINOPHIL NFR BLD AUTO: 3.2 %
ERYTHROCYTE [DISTWIDTH] IN BLOOD BY AUTOMATED COUNT: 14.4 % (ref 11–15)
GAMMA GLOBULIN: 1.1 G/DL (ref 0.5–1.4)
GLUCOSE SERPL-MCNC: 130 MG/DL (ref 65–139)
HCT VFR BLD AUTO: 42.9 % (ref 35–45)
HGB BLD-MCNC: 13.6 G/DL (ref 11.7–15.5)
HOLD SPECIMEN: NORMAL
LYMPHOCYTES # BLD AUTO: 1102 CELLS/UL (ref 850–3900)
LYMPHOCYTES NFR BLD AUTO: 20.8 %
M-PROTEIN 1: 0.1 G/DL (ref ?–0)
MCH RBC QN AUTO: 27.8 PG (ref 27–33)
MCHC RBC AUTO-ENTMCNC: 31.7 G/DL (ref 32–36)
MCV RBC AUTO: 87.7 FL (ref 80–100)
MONOCYTES # BLD AUTO: 737 CELLS/UL (ref 200–950)
MONOCYTES NFR BLD AUTO: 13.9 %
NEUTROPHILS # BLD AUTO: 3260 CELLS/UL (ref 1500–7800)
NEUTROPHILS NFR BLD AUTO: 61.5 %
PATH REVIEW-SERUM PROTEIN ELECTROPHORESIS: ABNORMAL
PLATELET # BLD AUTO: 323 THOUSAND/UL (ref 140–400)
PMV BLD REES-ECKER: 9.6 FL (ref 7.5–12.5)
POTASSIUM SERPL-SCNC: 4.8 MMOL/L (ref 3.5–5.3)
PROT SERPL-MCNC: 6.9 G/DL (ref 6.1–8.1)
PROTEIN ELECTROPHORESIS COMMENT: ABNORMAL
RBC # BLD AUTO: 4.89 MILLION/UL (ref 3.8–5.1)
SODIUM SERPL-SCNC: 138 MMOL/L (ref 135–146)
WBC # BLD AUTO: 5.3 THOUSAND/UL (ref 3.8–10.8)

## 2025-05-14 NOTE — PROGRESS NOTES
Subjective   Patient ID: Tanisha Dueñas is a 84 y.o. female who presents for follow-up visit    HPI   The patient reports a history of intermittent episodes of a grinding pain in the left knee times a few months.  She reports that the episodes of pain have been precipitated by the patient walking upstairs.  She reports no associated swelling or instability.  No other associated symptoms.  Over the past 3 months, she reports continued chronic intermittent episodes of pain-unable to describe-in the right knee.  She reports again that the episodes can be precipitated by standing, walking for an extended period of time and walking upstairs.  She reports no associated symptoms and over the past 3 months reports no change in the frequency or intensity of the episodes.    Since the patient has been applying clobetasol cream on a as needed basis to the palmar surfaces of both hands, she reports no further episodes of itching..    The patient reports that again when she uses Astelin nasal spray she notices less frequent blowing of the nose, decreased rhinorrhea from the right nostril and decrease postnasal drip.    She does report that a few days ago she experienced constant sharp aching pain in the right upper back region extending to the right side of the neck and the right axilla.  She reports an increase in the intensity of the pain with any movement.  She reports no radiation of discomfort.  She reports no associated right upper extremity weakness/paresthesias.  She reports no other associated symptoms.  She reports that the aforementioned pain developed a few days after she had been working in her yard.    She does report that since discontinuing oxcarbazepine in late March, she has felt much more alert, much less sedated.  No other new complaints.  Labs obtained 2 days ago were reviewed  Review of Systems    Objective   There were no vitals taken for this visit.    Physical Exam      Head - palpation revealed no  tenderness over the maxillary or frontal sinuses.    Nose - turbinates not erythematous or swollen ;no septal deviation noted.  Mouth -mild xerostomia noted.  Posterior pharynx is not erythematous or swollen. Tonsillar pillars appeared normal no exudates.  Neck - no lymphadenopathy. Thyroid gland not enlarged. No bruits.  Lungs - clear to auscultation bilaterally.  Cardiac - rate normal, rhythm regular, no murmurs, no JVD.  Abdomen - soft nondistended, normal active bowel sounds. Palpation revealed no tenderness or masses.  Pulses - 2+ bilaterally in the upper extremities; 0 bilaterally in the lower extremities  Extremities - no peripheral edema    Musculoskeletal  Left knee-no erythema or swelling.  Full range of motion with pain noted on flexion.  Full range of motion with no pain noted on flexion.  Palpation revealed no tenderness, increase in warmth, or crepitus.  Negative Lachemann's test, negative Juliana's test, negative patellar apprehension test, no pain or laxity of the collateral ligaments noted.  Assessment/Plan   Problem List Items Addressed This Visit         ICD-10-CM    Cervical myelopathy G95.9    Cervical radiculopathy M54.12    Relevant Medications    DULoxetine (Cymbalta) 20 mg DR capsule    Gait disorder R26.9    Osteoarthritis M19.90    Paresthesia of left foot R20.2    Hypertension - Primary I10   Other Visit Diagnoses       Codes      MGUS (monoclonal gammopathy of unknown significance)     D47.2               Assessment        History of chronic intermittent episodes of itching over the palmar surfaces of both hands-unsure of etiology.  May be secondary to contact dermatitis  Chronic intermittent presence of discoloration noted at the distal ends of the thumbs under the thumbnails-may represent tinea corporis, paronychia  Chronic brittle nails-unsure of etiology  Chronic presence of ridges in the fingernails-unsure of etiology  Chronic black discoloration of the third fourth and fifth  toenails of the left foot likely secondary to onychomycosis of the toenails  Diastolic dysfunction  Hypertension-blood pressure at goal  Chronic frequent blowing of nose in the morning, rhinorrhea right nostril, postnasal drip-May be secondary to allergic rhinitis, nonallergic rhinitis  Microcalcifications in the right breast noted on mammogram January 2016  Fibrocystic disease of the breast  Colonic polyps  Diverticulosis  Prolapsed hemorrhoid.  Chronic constipation-  Chronic intermittent nighttime episodes of urinary urgency with intermittent associated incontinence-may be secondary to overactive bladder, cystocele..  Cystocele  Chronic intermittent primarily evening episodes of diffuse stiffness-possibly secondary to osteoarthritis..  Osteoarthritis of the right acromioclavicular joint  Full-thickness tear distal supraspinatus tendon right shoulder  Chronic intermittent episodes of soreness, aching pain in the left elbow-May be secondary to osteoarthritis,    Osteoarthritis of the right hip  Intermittent episodes of grinding pain noted in the left knee-May be secondary to osteoarthritis  Chronic intermittent episodes of pain-unable to describe- in the right knee- probably secondary to osteoarthritis of the right knee  Osteoarthritis of the right knee  Hallux valgus rigidus left foot  Adult onset flatfoot deformity left foot  Osteoarthritis of the right ankle  Posterior tibialis tendinitis right foot  Adult onset flatfoot deformity right foot  Hyperlipidemia.  Bilateral cataracts-status post removal cataract right eye December 27, 2017 in the left eye January 17, 2018  Glaucoma  Macular degeneration  Dry eye syndrome  Vitamin D deficiency.  MGUS  Iron deficiency anemia  Chronic increased fatigability-may be secondary to lack of conditioning, iron deficiency anemia.   Small vessel ischemic changes.  Chronic forgetfulness-unsure of etiology.  Chronic poor balance-May be multifactorial secondary to the patient's  severe osteoarthritis especially in the right knee and possible cervical spondylotic myelopathy  Multifactorial gait disorder  Chronic intermittent episodes of pain-unable to describe-originating over the plantar surfaces of the forefoot regions of both feet radiating to the lower legs bilaterally-May be secondary to compression neuropathy, peripheral neuropathy, bilateral lumbar radiculopathies  Chronic constant numbness noted over the plantar surface of the forefoot region of the left foot-chronic intermittent episodes of numbness over the left lower leg-May be secondary to compression neuropathy, chronic left L5 radiculopathy   Chronic intermittent episodes of numbness originating over the plantar surface of the right foot extending throughout the foot radiating to the distal end of the lower leg precipitated by standing, walking-may be secondary to compression neuropathy, right lumbar radiculopathy  Chronic intermittent nighttime episodes of itching in the left upper back region may be likely secondary to a left cervical radiculopathy secondary to left-sided neuroforaminal stenosis cervical spine.  Chronic intermittent nighttime episodes of itching in the right upper back region-May be secondary to right-sided neuroforaminal stenosis cervical spine  Chronic intermittent episodes of crawling sensation right upper back region-may be secondary to right-sided neuroforaminal stenosis cervical spine  History of episode of constant aching pain in the right upper back region extending to the right side of the neck and in the right axilla-may have been secondary to a right cervical radiculopathy secondary to right-sided neuroforaminal stenosis cervical spine, herniated disc.  Bilateral neuroforaminal stenosis cervical spine C5-C6, C6-C7; moderate cord compression at C4-C5  Chronic intermittent episodes of aching pain in the lower back region-may be secondary to osteoarthritis and degenerative disc disease of the  lumbosacral spine  Chronic left L5 radiculopathy    Plan  I told the patient that she should apply the clobetasol cream twice daily but for no more than 14 consecutive days.  I have increased the patient's duloxetine dosage to 40 mg p.o. every morning, 20 mg p.o. every evening.  She will continue all of her other current medications for now  She will return for a follow-up visit in 5 weeks  Patient was identified as a fall risk. Risk prevention instructions provided.

## 2025-05-15 ENCOUNTER — OFFICE VISIT (OUTPATIENT)
Dept: PRIMARY CARE | Facility: CLINIC | Age: 85
End: 2025-05-15
Payer: MEDICARE

## 2025-05-15 VITALS
HEART RATE: 84 BPM | WEIGHT: 177 LBS | HEIGHT: 62 IN | DIASTOLIC BLOOD PRESSURE: 56 MMHG | TEMPERATURE: 96.3 F | SYSTOLIC BLOOD PRESSURE: 114 MMHG | BODY MASS INDEX: 32.57 KG/M2

## 2025-05-15 DIAGNOSIS — G95.9 CERVICAL MYELOPATHY: ICD-10-CM

## 2025-05-15 DIAGNOSIS — M54.12 CERVICAL RADICULOPATHY: ICD-10-CM

## 2025-05-15 DIAGNOSIS — R26.9 GAIT DISORDER: ICD-10-CM

## 2025-05-15 DIAGNOSIS — D47.2 MGUS (MONOCLONAL GAMMOPATHY OF UNKNOWN SIGNIFICANCE): ICD-10-CM

## 2025-05-15 DIAGNOSIS — M15.0 PRIMARY OSTEOARTHRITIS INVOLVING MULTIPLE JOINTS: ICD-10-CM

## 2025-05-15 DIAGNOSIS — I10 PRIMARY HYPERTENSION: Primary | ICD-10-CM

## 2025-05-15 DIAGNOSIS — R20.2 PARESTHESIA OF LEFT FOOT: ICD-10-CM

## 2025-05-15 PROCEDURE — 3078F DIAST BP <80 MM HG: CPT | Performed by: INTERNAL MEDICINE

## 2025-05-15 PROCEDURE — 1159F MED LIST DOCD IN RCRD: CPT | Performed by: INTERNAL MEDICINE

## 2025-05-15 PROCEDURE — 3074F SYST BP LT 130 MM HG: CPT | Performed by: INTERNAL MEDICINE

## 2025-05-15 PROCEDURE — 99214 OFFICE O/P EST MOD 30 MIN: CPT | Performed by: INTERNAL MEDICINE

## 2025-05-15 PROCEDURE — 1160F RVW MEDS BY RX/DR IN RCRD: CPT | Performed by: INTERNAL MEDICINE

## 2025-05-15 RX ORDER — CLOBETASOL PROPIONATE 0.5 MG/G
CREAM TOPICAL 2 TIMES DAILY
COMMUNITY

## 2025-05-15 RX ORDER — DULOXETIN HYDROCHLORIDE 20 MG/1
60 CAPSULE, DELAYED RELEASE ORAL 2 TIMES DAILY
Qty: 270 CAPSULE | Refills: 1 | Status: SHIPPED | OUTPATIENT
Start: 2025-05-15

## 2025-06-19 ENCOUNTER — APPOINTMENT (OUTPATIENT)
Dept: OPHTHALMOLOGY | Facility: CLINIC | Age: 85
End: 2025-06-19
Payer: MEDICARE

## 2025-06-20 DIAGNOSIS — I10 HYPERTENSION, UNSPECIFIED TYPE: ICD-10-CM

## 2025-06-20 RX ORDER — AMLODIPINE BESYLATE 5 MG/1
5 TABLET ORAL DAILY
Qty: 100 TABLET | Refills: 2 | Status: SHIPPED | OUTPATIENT
Start: 2025-06-20

## 2025-06-29 NOTE — PROGRESS NOTES
Subjective   Patient ID: Tanisha Dueñas is a 84 y.o. female who presents for 6-week follow-up visit    HPI   The patient reports that since her last office visit, she has experienced intermittent episodes of spasms in the right hand, right forearm.  She reports that the episodes have occurred at rest.  She reports associated intermittent episodes of numbness primarily in the 4th and 5th fingers of the right hand.  She reports no associated weakness.  She reports no preceding trauma/overexertion.  No other associated symptoms.    The patient reports no episodes of soreness, aching pain in the left elbow for 1 month.    Over the past 6 weeks, the patient reports continued chronic intermittent episodes of grinding pain in the left knee, continued chronic intermittent episodes of pain-unable to describe in the right knee.  She reports no associated symptoms.  Since increasing her dosage of duloxetine, she has noted a decrease in the frequency and intensity of the episodes of pain.    Over the past 6 weeks, the patient reports continued chronic intermittent episodes of pain-unable to describe-originating over the plantar surfaces of the forefoot regions of both feet radiating to the lower legs.  She still reports that the episodes can be precipitated by standing and walking.  She reports continued chronic constant numbness noted over the plantar surface of the forefoot region of the left foot,, chronic intermittent episodes of numbness over the left lower leg continued chronic intermittent episodes of numbness originating over the plantar surface of the right foot extending throughout the foot radiating to the distal end of the lower leg.  Over the past 6 weeks, she reports a decrease in the frequency and intensity of the episodes of pain and a decrease in the frequency and intensity of the episodes of numbness over the plantar surface of the right foot.  She reports no change in the intensity of numbness over the plantar  surface of the forefoot region of the left foot.    Over the past 6 weeks, the patient reports continued chronic intermittent nighttime episodes of itching in the right upper back region only.  She reports no episodes of itching in the left upper back region and no recent episodes of a crawling sensation in the right upper back region.  She reports no associated symptoms.  She does report that the itching in the right upper back region resolves with application of hydrocortisone cream.    Review of Systems    Objective   There were no vitals taken for this visit.    Physical Exam  Neurologic  Upper extremities:  Motor--mild atrophy noted interossei muscles right hand.  Strength 5/5 in all muscle groups tested  Sensory  Light touch sensation diminished right fifth finger  Pinprick sensation diminished right thumb  Reflexes-2+/4 bilaterally  Negative Tinel's sign, negative Phalen sign, negative carpal compression sign right wrist  Assessment/Plan             Assessment    Chronic intermittent presence of discoloration noted at the distal ends of the thumbs under the thumbnails-may represent tinea corporis, paronychia  Chronic brittle nails-unsure of etiology  Chronic presence of ridges in the fingernails-unsure of etiology  Chronic black discoloration of the third fourth and fifth toenails of the left foot likely secondary to onychomycosis of the toenails  Diastolic dysfunction  Hypertension-blood pressure at goal  Chronic frequent blowing of nose in the morning, rhinorrhea right nostril, postnasal drip-May be secondary to allergic rhinitis, nonallergic rhinitis  Microcalcifications in the right breast noted on mammogram January 2016  Fibrocystic disease of the breast  Colonic polyps  Diverticulosis  Prolapsed hemorrhoid.  Chronic constipation-  Chronic intermittent nighttime episodes of urinary urgency with intermittent associated incontinence-may be secondary to overactive bladder, cystocele..  Cystocele  Chronic  intermittent primarily evening episodes of diffuse stiffness-possibly secondary to osteoarthritis..  Osteoarthritis of the right acromioclavicular joint  Full-thickness tear distal supraspinatus tendon right shoulder  Chronic intermittent episodes of soreness, aching pain in the left elbow-May be secondary to osteoarthritis,    Osteoarthritis of the right hip  Chronic intermittent episodes of grinding pain noted in the left knee-May be secondary to osteoarthritis  Chronic intermittent episodes of pain-unable to describe- in the right knee- probably secondary to osteoarthritis of the right knee  Osteoarthritis of the right knee  Hallux valgus rigidus left foot  Adult onset flatfoot deformity left foot  Osteoarthritis of the right ankle  Posterior tibialis tendinitis right foot  Adult onset flatfoot deformity right foot  Hyperlipidemia.  Bilateral cataracts-status post removal cataract right eye December 27, 2017 in the left eye January 17, 2018  Glaucoma  Macular degeneration  Dry eye syndrome  Vitamin D deficiency.  MGUS  Iron deficiency anemia  Chronic increased fatigability-may be secondary to lack of conditioning, iron deficiency anemia.   Small vessel ischemic changes.  Chronic forgetfulness-unsure of etiology.  Intermittent episodes of spasms extending from the right hand to the right elbow, intermittent episodes of numbness primarily in the 4th and 5th fingers of the right hand-May be secondary to a right ulnar neuropathy, right cervical radiculopathy secondary to right-sided neuroforaminal stenosis cervical spine, cord compression at C4-C5  Chronic poor balance-May be multifactorial secondary to the patient's severe osteoarthritis especially in the right knee and possible cervical spondylotic myelopathy  Multifactorial gait disorder  Chronic intermittent episodes of pain-unable to describe-originating over the plantar surfaces of the forefoot regions of both feet radiating to the lower legs bilaterally-May be  secondary to compression neuropathy, peripheral neuropathy, bilateral lumbar radiculopathies  Chronic constant numbness noted over the plantar surface of the forefoot region of the left foot-chronic intermittent episodes of numbness over the left lower leg-May be secondary to compression neuropathy, chronic left L5 radiculopathy   Chronic intermittent episodes of numbness originating over the plantar surface of the right foot extending throughout the foot radiating to the distal end of the lower leg precipitated by standing, walking-may be secondary to compression neuropathy, right lumbar radiculopathy  Chronic intermittent nighttime episodes of itching in the left upper back region may be likely secondary to a left cervical radiculopathy secondary to left-sided neuroforaminal stenosis cervical spine.  Chronic intermittent nighttime episodes of itching in the right upper back region-May be secondary to right-sided neuroforaminal stenosis cervical spine  Chronic intermittent episodes of crawling sensation right upper back region-may be secondary to right-sided neuroforaminal stenosis cervical spine  Bilateral neuroforaminal stenosis cervical spine C5-C6, C6-C7; moderate cord compression at C4-C5  Chronic intermittent episodes of aching pain in the lower back region-may be secondary to osteoarthritis and degenerative disc disease of the lumbosacral spine  Chronic left L5 radiculopathy      Plan  Begin Medrol Dosepak as directed  For now, I will continue the patient on her current dosage of duloxetine.  The patient will call me in 1 week with her condition, she may require again a neurology referral

## 2025-07-01 ENCOUNTER — APPOINTMENT (OUTPATIENT)
Dept: OPHTHALMOLOGY | Facility: CLINIC | Age: 85
End: 2025-07-01
Payer: MEDICARE

## 2025-07-01 ENCOUNTER — OFFICE VISIT (OUTPATIENT)
Dept: PRIMARY CARE | Facility: CLINIC | Age: 85
End: 2025-07-01
Payer: MEDICARE

## 2025-07-01 VITALS
DIASTOLIC BLOOD PRESSURE: 64 MMHG | BODY MASS INDEX: 33.13 KG/M2 | SYSTOLIC BLOOD PRESSURE: 130 MMHG | HEIGHT: 62 IN | WEIGHT: 180 LBS | HEART RATE: 92 BPM | TEMPERATURE: 97.5 F

## 2025-07-01 DIAGNOSIS — I10 HYPERTENSION, UNSPECIFIED TYPE: ICD-10-CM

## 2025-07-01 DIAGNOSIS — M15.0 PRIMARY OSTEOARTHRITIS INVOLVING MULTIPLE JOINTS: Primary | ICD-10-CM

## 2025-07-01 DIAGNOSIS — M79.605 PAIN IN BOTH LOWER EXTREMITIES: ICD-10-CM

## 2025-07-01 DIAGNOSIS — M79.604 PAIN IN BOTH LOWER EXTREMITIES: ICD-10-CM

## 2025-07-01 DIAGNOSIS — R20.2 PARESTHESIA OF RIGHT ARM: ICD-10-CM

## 2025-07-01 DIAGNOSIS — G56.21 ULNAR NEUROPATHY AT ELBOW OF RIGHT UPPER EXTREMITY: ICD-10-CM

## 2025-07-01 DIAGNOSIS — M54.12 CERVICAL RADICULOPATHY: ICD-10-CM

## 2025-07-01 PROCEDURE — 3078F DIAST BP <80 MM HG: CPT | Performed by: INTERNAL MEDICINE

## 2025-07-01 PROCEDURE — 3075F SYST BP GE 130 - 139MM HG: CPT | Performed by: INTERNAL MEDICINE

## 2025-07-01 PROCEDURE — 99213 OFFICE O/P EST LOW 20 MIN: CPT | Performed by: INTERNAL MEDICINE

## 2025-07-01 RX ORDER — METHYLPREDNISOLONE 4 MG/1
TABLET ORAL
Qty: 21 TABLET | Refills: 0 | Status: SHIPPED | OUTPATIENT
Start: 2025-07-01 | End: 2025-07-07

## 2025-07-01 RX ORDER — DULOXETIN HYDROCHLORIDE 20 MG/1
CAPSULE, DELAYED RELEASE ORAL
Qty: 270 CAPSULE | Refills: 1 | Status: SHIPPED | OUTPATIENT
Start: 2025-07-01

## 2025-07-07 NOTE — PROGRESS NOTES
7/9/2025 CC: 84 y.o. presents for 4 mo glaucoma FU w/ IOP check and HVF. She missed previous appointment    Past ocular history: POAG, Pseudophakia OU  Family history: no family history of glaucoma   Past medical history: HTN, HZV, BPPV   Social history: denies tobacco     Eye medications:   Both eyes: Continue Latanoprost qhs and Brimonidine bid     Previous:   Timolol bid    Allergy:  Codeine    Testing:    HVF 24-2 7/9/2025: OD- SAD, MD -14.5. OS- SAD>IAD, 19.4 dB  HVF 24-2 3/12/2025: OD- SAD, MD -13.3. OS- SAD>IAD, MD -19.1 dB    OCT 3/12/2025: OD- thin I/S, avg 57. OS- thin I/S, avg 53 um. GCA: diffuse thin OU. Mac.: Regular macular contour, /214    Pachymetry 3/12/2025: 549/551    Gonioscopy 3/12/2025: open OU    Tmax: unknown    Assessment:   Primary open angle glaucoma, moderate OD, severe OS  - Negative fhx  - Average C  - Thin rim OS  - Testing: Baseline today (ask for previous records)  - Tolerates Latanoprost qhs and Timolol bid  Target IOP: mid-teens/low teens  - IOP 3/12/2025: 13 OU. Given her medical hx and target IOP OS, will switch Timolol to Brimonidine bid.  - 7/9/2025: IOP 11. Stable exam/HVF. R/B/A surgery, elected to continue gtt. Plan for YAG Cap OD      Pseudophakia OU  -s/p PCIOL (~ 10 yrs ago)    RE  -Low hyperopia /ast., presbyopia     Plan:   I explained my findings. POAG, stable    Eye medications:   Both eyes: Continue Latanoprost qhs and Brimonidine bid     Return in 3 mo, OCT

## 2025-07-08 DIAGNOSIS — M54.12 CERVICAL RADICULOPATHY: Primary | ICD-10-CM

## 2025-07-08 NOTE — PROGRESS NOTES
The patient reports continued intermittent episodes of spasms extending from the right elbow to the right hand-unchanged since completing a course of the Medrol Dosepak.  However, she reports no recent episodes of numbness in the 4th and 5th fingers of the right hand.  I still think that the patient's symptoms are probably secondary to a right cervical radiculopathy.  I will however obtain an EMG study of the right upper extremity.  For now, she will continue her current dosages of duloxetine and naproxen.  She may be a good candidate for initiation of therapy with topiramate

## 2025-07-09 ENCOUNTER — APPOINTMENT (OUTPATIENT)
Dept: OPHTHALMOLOGY | Facility: CLINIC | Age: 85
End: 2025-07-09
Payer: MEDICARE

## 2025-07-09 DIAGNOSIS — H40.1133 PRIMARY OPEN ANGLE GLAUCOMA (POAG) OF BOTH EYES, SEVERE STAGE: Primary | ICD-10-CM

## 2025-07-09 PROCEDURE — 92083 EXTENDED VISUAL FIELD XM: CPT | Performed by: OPHTHALMOLOGY

## 2025-07-09 PROCEDURE — 99213 OFFICE O/P EST LOW 20 MIN: CPT | Performed by: OPHTHALMOLOGY

## 2025-07-09 RX ORDER — LATANOPROST 50 UG/ML
1 SOLUTION/ DROPS OPHTHALMIC NIGHTLY
Qty: 7.5 ML | Refills: 4 | Status: SHIPPED | OUTPATIENT
Start: 2025-07-09

## 2025-07-09 RX ORDER — BRIMONIDINE TARTRATE 2 MG/ML
1 SOLUTION/ DROPS OPHTHALMIC 2 TIMES DAILY
Qty: 45 ML | Refills: 4 | Status: SHIPPED | OUTPATIENT
Start: 2025-07-09 | End: 2026-07-09

## 2025-07-09 ASSESSMENT — ENCOUNTER SYMPTOMS
MUSCULOSKELETAL NEGATIVE: 0
ALLERGIC/IMMUNOLOGIC NEGATIVE: 0
PSYCHIATRIC NEGATIVE: 0
EYES NEGATIVE: 0
CONSTITUTIONAL NEGATIVE: 0
CARDIOVASCULAR NEGATIVE: 0
RESPIRATORY NEGATIVE: 0
HEMATOLOGIC/LYMPHATIC NEGATIVE: 0
ENDOCRINE NEGATIVE: 0
NEUROLOGICAL NEGATIVE: 0
GASTROINTESTINAL NEGATIVE: 0

## 2025-07-09 ASSESSMENT — CONF VISUAL FIELD
OS_NORMAL: 1
OD_SUPERIOR_NASAL_RESTRICTION: 3
OS_SUPERIOR_NASAL_RESTRICTION: 0
OS_INFERIOR_NASAL_RESTRICTION: 0
OS_INFERIOR_TEMPORAL_RESTRICTION: 0
METHOD: COUNTING FINGERS
OS_SUPERIOR_TEMPORAL_RESTRICTION: 0

## 2025-07-09 ASSESSMENT — SLIT LAMP EXAM - LIDS
COMMENTS: NORMAL
COMMENTS: NORMAL

## 2025-07-09 ASSESSMENT — EXTERNAL EXAM - LEFT EYE: OS_EXAM: NORMAL

## 2025-07-09 ASSESSMENT — TONOMETRY
OD_IOP_MMHG: 11
IOP_METHOD: GOLDMANN APPLANATION
OS_IOP_MMHG: 11

## 2025-07-09 ASSESSMENT — REFRACTION_WEARINGRX
OD_SPHERE: +0.25
OS_AXIS: 003
OS_SPHERE: +0.25
OS_CYLINDER: -0.25
OD_CYLINDER: -0.25
OD_ADD: +3.00
OD_AXIS: 082
OS_ADD: +3.00

## 2025-07-09 ASSESSMENT — PACHYMETRY
EXAM_DATE: 3/12/2025
OD_CT(UM): 549
OS_CT(UM): 551

## 2025-07-09 ASSESSMENT — VISUAL ACUITY
CORRECTION_TYPE: GLASSES
OD_CC: 20/25
OS_CC: 20/25
OD_CC+: -1
METHOD: SNELLEN - LINEAR

## 2025-07-09 ASSESSMENT — CUP TO DISC RATIO
OS_RATIO: 0.9
OD_RATIO: 0.9

## 2025-07-09 ASSESSMENT — EXTERNAL EXAM - RIGHT EYE: OD_EXAM: NORMAL

## 2025-08-16 ENCOUNTER — OFFICE VISIT (OUTPATIENT)
Dept: URGENT CARE | Age: 85
End: 2025-08-16
Payer: MEDICARE

## 2025-08-16 VITALS
HEART RATE: 89 BPM | RESPIRATION RATE: 18 BRPM | OXYGEN SATURATION: 95 % | DIASTOLIC BLOOD PRESSURE: 63 MMHG | TEMPERATURE: 97.7 F | SYSTOLIC BLOOD PRESSURE: 112 MMHG

## 2025-08-16 DIAGNOSIS — M15.9 GENERALIZED OSTEOARTHROSIS, INVOLVING MULTIPLE SITES: Primary | ICD-10-CM

## 2025-08-16 PROCEDURE — 3078F DIAST BP <80 MM HG: CPT | Performed by: PHYSICIAN ASSISTANT

## 2025-08-16 PROCEDURE — 99203 OFFICE O/P NEW LOW 30 MIN: CPT | Performed by: PHYSICIAN ASSISTANT

## 2025-08-16 PROCEDURE — 1159F MED LIST DOCD IN RCRD: CPT | Performed by: PHYSICIAN ASSISTANT

## 2025-08-16 PROCEDURE — 1125F AMNT PAIN NOTED PAIN PRSNT: CPT | Performed by: PHYSICIAN ASSISTANT

## 2025-08-16 PROCEDURE — 3074F SYST BP LT 130 MM HG: CPT | Performed by: PHYSICIAN ASSISTANT

## 2025-08-16 PROCEDURE — 1160F RVW MEDS BY RX/DR IN RCRD: CPT | Performed by: PHYSICIAN ASSISTANT

## 2025-08-16 RX ORDER — METHYLPREDNISOLONE 4 MG/1
TABLET ORAL
Qty: 21 TABLET | Refills: 0 | Status: SHIPPED | OUTPATIENT
Start: 2025-08-16

## 2025-08-16 ASSESSMENT — ENCOUNTER SYMPTOMS
FEVER: 0
CHILLS: 0
DIAPHORESIS: 0
LEG PAIN: 1
NUMBNESS: 0
ARTHRALGIAS: 0
WEAKNESS: 0
MYALGIAS: 1

## 2025-08-16 ASSESSMENT — PAIN SCALES - GENERAL: PAINLEVEL_OUTOF10: 10-WORST PAIN EVER

## 2025-08-29 DIAGNOSIS — M54.12 CERVICAL RADICULOPATHY: Primary | ICD-10-CM

## 2025-08-29 DIAGNOSIS — M79.605 PAIN IN BOTH LOWER EXTREMITIES: ICD-10-CM

## 2025-08-29 DIAGNOSIS — M79.604 PAIN IN BOTH LOWER EXTREMITIES: ICD-10-CM

## 2025-08-29 DIAGNOSIS — R20.2 PARESTHESIA OF RIGHT ARM: ICD-10-CM

## 2025-08-29 DIAGNOSIS — M15.0 PRIMARY OSTEOARTHRITIS INVOLVING MULTIPLE JOINTS: ICD-10-CM

## 2025-08-29 RX ORDER — TOPIRAMATE 25 MG/1
25 TABLET, FILM COATED ORAL NIGHTLY
Qty: 30 TABLET | Refills: 1 | Status: SHIPPED | OUTPATIENT
Start: 2025-08-29 | End: 2026-08-29

## 2025-10-16 ENCOUNTER — APPOINTMENT (OUTPATIENT)
Dept: OPHTHALMOLOGY | Facility: CLINIC | Age: 85
End: 2025-10-16
Payer: MEDICARE